# Patient Record
Sex: FEMALE | Race: BLACK OR AFRICAN AMERICAN | NOT HISPANIC OR LATINO | Employment: FULL TIME | URBAN - METROPOLITAN AREA
[De-identification: names, ages, dates, MRNs, and addresses within clinical notes are randomized per-mention and may not be internally consistent; named-entity substitution may affect disease eponyms.]

---

## 2017-08-21 ENCOUNTER — HISTORICAL (OUTPATIENT)
Dept: LAB | Facility: HOSPITAL | Age: 27
End: 2017-08-21

## 2017-08-21 LAB
ABS NEUT (OLG): 6.4 X10(3)/MCL (ref 2.1–9.2)
AMPHET UR QL SCN: NORMAL
BARBITURATE SCN PRESENT UR: NORMAL
BASOPHILS NFR BLD AUTO: 0 % (ref 0–2)
BENZODIAZ UR QL SCN: NORMAL
CANNABINOIDS UR QL SCN: NORMAL
COCAINE UR QL SCN: NORMAL
EOSINOPHIL # BLD AUTO: 0.1 X10(3)/MCL
EOSINOPHIL NFR BLD AUTO: 1 %
ERYTHROCYTE [DISTWIDTH] IN BLOOD BY AUTOMATED COUNT: 13.3 % (ref 11.5–17)
GROUP & RH: NORMAL
HBV SURFACE AG SERPL QL IA: NEGATIVE
HCT VFR BLD AUTO: 38.4 % (ref 37–47)
HCV AB SERPL QL IA: NEGATIVE
HGB BLD-MCNC: 13.1 GM/DL (ref 12–16)
HIV 1+2 AB+HIV1 P24 AG SERPL QL IA: NEGATIVE
LYMPHOCYTES # BLD AUTO: 2 X10(3)/MCL
LYMPHOCYTES NFR BLD AUTO: 22 % (ref 13–40)
MCH RBC QN AUTO: 33.1 PG (ref 27–31)
MCHC RBC AUTO-ENTMCNC: 34 GM/DL (ref 33–36)
MCV RBC AUTO: 97.3 FL (ref 80–94)
MONOCYTES # BLD AUTO: 0.5 X10(3)/MCL
MONOCYTES NFR BLD AUTO: 6 % (ref 2–11)
NEUTROPHILS # BLD AUTO: 6.4 X10(3)/MCL (ref 2.1–9.2)
NEUTROPHILS NFR BLD AUTO: 71 % (ref 47–80)
OPIATES UR QL SCN: NORMAL
PCP UR QL: NORMAL
PH UR STRIP.AUTO: 5 [PH] (ref 5–7.5)
PLATELET # BLD AUTO: 157 X10(3)/MCL (ref 130–400)
PLATELET # BLD EST: ADEQUATE 10*3/UL
PMV BLD AUTO: 11.4 FL (ref 7.4–10.4)
RBC # BLD AUTO: 3.95 X10(6)/MCL (ref 4.2–5.4)
RPR SER QL: NORMAL
T4 FREE SERPL-MCNC: 1.18 NG/DL (ref 0.76–1.46)
TSH SERPL-ACNC: 0.77 MIU/ML (ref 0.36–3.74)
WBC # SPEC AUTO: 9.1 X10(3)/MCL (ref 4.5–11.5)

## 2017-10-16 ENCOUNTER — HISTORICAL (OUTPATIENT)
Dept: LAB | Facility: HOSPITAL | Age: 27
End: 2017-10-16

## 2017-12-22 ENCOUNTER — HISTORICAL (OUTPATIENT)
Dept: LAB | Facility: HOSPITAL | Age: 27
End: 2017-12-22

## 2017-12-22 LAB — GLUCOSE 1H P 100 G GLC PO SERPL-MCNC: 144 MG/DL (ref 100–180)

## 2017-12-29 ENCOUNTER — HISTORICAL (OUTPATIENT)
Dept: LAB | Facility: HOSPITAL | Age: 27
End: 2017-12-29

## 2017-12-29 LAB
GLUCOSE 1H P 100 G GLC PO SERPL-MCNC: 144 MG/DL (ref 100–180)
GLUCOSE 2H P 100 G GLC PO SERPL-MCNC: 132 MG/DL (ref 70–140)
GLUCOSE 3H P 100 G GLC PO SERPL-MCNC: 66 MG/DL (ref 70–115)
GLUCOSE BS SERPL-MCNC: 86 MG/DL (ref 70–115)

## 2018-03-05 LAB — POC BETA-HCG (QUAL): NEGATIVE

## 2018-05-10 LAB — POC BETA-HCG (QUAL): NEGATIVE

## 2018-09-10 LAB — POC BETA-HCG (QUAL): NEGATIVE

## 2018-09-11 ENCOUNTER — HISTORICAL (OUTPATIENT)
Dept: LAB | Facility: HOSPITAL | Age: 28
End: 2018-09-11

## 2018-10-05 ENCOUNTER — HISTORICAL (OUTPATIENT)
Dept: ADMINISTRATIVE | Facility: HOSPITAL | Age: 28
End: 2018-10-05

## 2018-10-05 LAB
BILIRUB SERPL-MCNC: NEGATIVE MG/DL
BLOOD URINE, POC: NEGATIVE
CLARITY, POC UA: CLEAR
COLOR, POC UA: YELLOW
GLUCOSE UR QL STRIP: NEGATIVE
KETONES UR QL STRIP: NEGATIVE
LEUKOCYTE EST, POC UA: NEGATIVE
NITRITE, POC UA: NEGATIVE
PH, POC UA: 5.5
POC BETA-HCG (QUAL): NEGATIVE
PROTEIN, POC: NEGATIVE
SPECIFIC GRAVITY, POC UA: 1.03
UROBILINOGEN, POC UA: NORMAL

## 2019-04-24 LAB — POC BETA-HCG (QUAL): NEGATIVE

## 2019-10-23 ENCOUNTER — HISTORICAL (OUTPATIENT)
Dept: LAB | Facility: HOSPITAL | Age: 29
End: 2019-10-23

## 2019-10-23 LAB
ABS NEUT (OLG): 7.73 X10(3)/MCL (ref 2.1–9.2)
AMPHET UR QL SCN: NORMAL
BARBITURATE SCN PRESENT UR: NORMAL
BASOPHILS # BLD AUTO: 0 X10(3)/MCL (ref 0–0.2)
BASOPHILS NFR BLD AUTO: 0 %
BENZODIAZ UR QL SCN: NORMAL
CANNABINOIDS UR QL SCN: NORMAL
COCAINE UR QL SCN: NORMAL
EOSINOPHIL # BLD AUTO: 0 X10(3)/MCL (ref 0–0.9)
EOSINOPHIL NFR BLD AUTO: 0 %
ERYTHROCYTE [DISTWIDTH] IN BLOOD BY AUTOMATED COUNT: 12.2 % (ref 11.5–17)
GROUP & RH: NORMAL
HBV SURFACE AG SERPL QL IA: NEGATIVE
HCT VFR BLD AUTO: 36.4 % (ref 37–47)
HCV AB SERPL QL IA: NEGATIVE
HGB BLD-MCNC: 11.8 GM/DL (ref 12–16)
HIV 1+2 AB+HIV1 P24 AG SERPL QL IA: NEGATIVE
IMM GRANULOCYTES # BLD AUTO: 0.02 % (ref 0–0.02)
IMM GRANULOCYTES NFR BLD AUTO: 0.2 % (ref 0–0.43)
LYMPHOCYTES # BLD AUTO: 2.3 X10(3)/MCL (ref 0.6–4.6)
LYMPHOCYTES NFR BLD AUTO: 22 %
MCH RBC QN AUTO: 32.1 PG (ref 27–31)
MCHC RBC AUTO-ENTMCNC: 32.4 GM/DL (ref 33–36)
MCV RBC AUTO: 98.9 FL (ref 80–94)
MONOCYTES # BLD AUTO: 0.7 X10(3)/MCL (ref 0.1–1.3)
MONOCYTES NFR BLD AUTO: 6 %
NEUTROPHILS # BLD AUTO: 7.73 X10(3)/MCL (ref 1.4–7.9)
NEUTROPHILS NFR BLD AUTO: 71 %
OPIATES UR QL SCN: NORMAL
PCP UR QL: NORMAL
PH UR STRIP.AUTO: 7 [PH] (ref 5–7.5)
PLATELET # BLD AUTO: 185 X10(3)/MCL (ref 130–400)
PMV BLD AUTO: 13 FL (ref 9.4–12.4)
POC BETA-HCG (QUAL): POSITIVE
RBC # BLD AUTO: 3.68 X10(6)/MCL (ref 4.2–5.4)
T PALLIDUM AB SER QL: NORMAL
T4 FREE SERPL-MCNC: 0.98 NG/DL (ref 0.76–1.46)
TSH SERPL-ACNC: 1.32 MIU/ML (ref 0.36–3.74)
WBC # SPEC AUTO: 10.8 X10(3)/MCL (ref 4.5–11.5)

## 2019-10-24 ENCOUNTER — HISTORICAL (OUTPATIENT)
Dept: LAB | Facility: HOSPITAL | Age: 29
End: 2019-10-24

## 2020-01-02 ENCOUNTER — HISTORICAL (OUTPATIENT)
Dept: LAB | Facility: HOSPITAL | Age: 30
End: 2020-01-02

## 2020-04-09 ENCOUNTER — HISTORICAL (OUTPATIENT)
Dept: CARDIOLOGY | Facility: HOSPITAL | Age: 30
End: 2020-04-09

## 2020-04-11 ENCOUNTER — HISTORICAL (OUTPATIENT)
Dept: OBSTETRICS AND GYNECOLOGY | Facility: HOSPITAL | Age: 30
End: 2020-04-11

## 2020-04-18 ENCOUNTER — HISTORICAL (OUTPATIENT)
Dept: OBSTETRICS AND GYNECOLOGY | Facility: HOSPITAL | Age: 30
End: 2020-04-18

## 2020-04-23 ENCOUNTER — HISTORICAL (OUTPATIENT)
Dept: ADMINISTRATIVE | Facility: HOSPITAL | Age: 30
End: 2020-04-23

## 2020-04-23 LAB
ABS NEUT (OLG): 6.47 X10(3)/MCL (ref 2.1–9.2)
ALT SERPL-CCNC: 9 UNIT/L (ref 0–55)
AST SERPL-CCNC: 13 UNIT/L (ref 5–34)
ERYTHROCYTE [DISTWIDTH] IN BLOOD BY AUTOMATED COUNT: 13.5 % (ref 11.5–17)
HCT VFR BLD AUTO: 33.5 % (ref 37–47)
HGB BLD-MCNC: 11.1 GM/DL (ref 12–16)
LDH SERPL-CCNC: 186 UNIT/L (ref 140–271)
MCH RBC QN AUTO: 33.7 PG (ref 27–31)
MCHC RBC AUTO-ENTMCNC: 33.1 GM/DL (ref 33–36)
MCV RBC AUTO: 101.8 FL (ref 80–94)
PLATELET # BLD AUTO: 114 X10(3)/MCL (ref 130–400)
PMV BLD AUTO: 12.9 FL (ref 9.4–12.4)
RBC # BLD AUTO: 3.29 X10(6)/MCL (ref 4.2–5.4)
URATE SERPL-MCNC: 4.9 MG/DL (ref 2.7–6)
WBC # SPEC AUTO: 8.7 X10(3)/MCL (ref 4.5–11.5)

## 2020-04-25 ENCOUNTER — HISTORICAL (OUTPATIENT)
Dept: OBSTETRICS AND GYNECOLOGY | Facility: HOSPITAL | Age: 30
End: 2020-04-25

## 2020-04-27 ENCOUNTER — HISTORICAL (OUTPATIENT)
Dept: ADMINISTRATIVE | Facility: HOSPITAL | Age: 30
End: 2020-04-27

## 2020-04-27 LAB
ALT SERPL-CCNC: 8 UNIT/L (ref 0–55)
AST SERPL-CCNC: 13 UNIT/L (ref 5–34)
ERYTHROCYTE [DISTWIDTH] IN BLOOD BY AUTOMATED COUNT: 13.5 % (ref 11.5–17)
HCT VFR BLD AUTO: 32.2 % (ref 37–47)
HGB BLD-MCNC: 11 GM/DL (ref 12–16)
LDH SERPL-CCNC: 192 UNIT/L (ref 140–271)
MCH RBC QN AUTO: 34.2 PG (ref 27–31)
MCHC RBC AUTO-ENTMCNC: 34.2 GM/DL (ref 33–36)
MCV RBC AUTO: 100 FL (ref 80–94)
PLATELET # BLD AUTO: 109 X10(3)/MCL (ref 130–400)
PMV BLD AUTO: 12.9 FL (ref 9.4–12.4)
RBC # BLD AUTO: 3.22 X10(6)/MCL (ref 4.2–5.4)
URATE SERPL-MCNC: 4.9 MG/DL (ref 2.7–6)
WBC # SPEC AUTO: 7.2 X10(3)/MCL (ref 4.5–11.5)

## 2020-04-30 ENCOUNTER — HISTORICAL (OUTPATIENT)
Dept: ADMINISTRATIVE | Facility: HOSPITAL | Age: 30
End: 2020-04-30

## 2020-04-30 LAB
ALT SERPL-CCNC: 8 UNIT/L (ref 0–55)
AST SERPL-CCNC: 14 UNIT/L (ref 5–34)
ERYTHROCYTE [DISTWIDTH] IN BLOOD BY AUTOMATED COUNT: 13.6 % (ref 11.5–17)
HCT VFR BLD AUTO: 34.6 % (ref 37–47)
HGB BLD-MCNC: 11.6 GM/DL (ref 12–16)
LDH SERPL-CCNC: 215 UNIT/L (ref 140–271)
MCH RBC QN AUTO: 33.8 PG (ref 27–31)
MCHC RBC AUTO-ENTMCNC: 33.5 GM/DL (ref 33–36)
MCV RBC AUTO: 100.9 FL (ref 80–94)
PLATELET # BLD AUTO: 113 X10(3)/MCL (ref 130–400)
PMV BLD AUTO: 12.2 FL (ref 9.4–12.4)
RBC # BLD AUTO: 3.43 X10(6)/MCL (ref 4.2–5.4)
URATE SERPL-MCNC: 4.8 MG/DL (ref 2.7–6)
WBC # SPEC AUTO: 7.8 X10(3)/MCL (ref 4.5–11.5)

## 2020-06-10 ENCOUNTER — HISTORICAL (OUTPATIENT)
Dept: LAB | Facility: HOSPITAL | Age: 30
End: 2020-06-10

## 2020-06-10 LAB
B-HCG FREE SERPL-ACNC: <1 MIU/ML
BILIRUB SERPL-MCNC: NEGATIVE MG/DL
BLOOD URINE, POC: NEGATIVE
GLUCOSE UR QL STRIP: NEGATIVE
KETONES UR QL STRIP: NORMAL
LEUKOCYTE EST, POC UA: NEGATIVE
NITRITE, POC UA: NEGATIVE
PH, POC UA: 5
POC BETA-HCG (QUAL): POSITIVE
PROTEIN, POC: NEGATIVE
SPECIFIC GRAVITY, POC UA: 1.02
UROBILINOGEN, POC UA: NORMAL

## 2020-10-15 LAB — POC BETA-HCG (QUAL): NEGATIVE

## 2021-05-11 LAB — POC BETA-HCG (QUAL): NEGATIVE

## 2021-05-12 ENCOUNTER — HISTORICAL (OUTPATIENT)
Dept: RADIOLOGY | Facility: HOSPITAL | Age: 31
End: 2021-05-12

## 2021-11-03 LAB — POC BETA-HCG (QUAL): NEGATIVE

## 2022-02-01 ENCOUNTER — HISTORICAL (OUTPATIENT)
Dept: ADMINISTRATIVE | Facility: HOSPITAL | Age: 32
End: 2022-02-01

## 2022-02-01 ENCOUNTER — HISTORICAL (OUTPATIENT)
Dept: RADIOLOGY | Facility: HOSPITAL | Age: 32
End: 2022-02-01

## 2022-04-11 ENCOUNTER — HISTORICAL (OUTPATIENT)
Dept: ADMINISTRATIVE | Facility: HOSPITAL | Age: 32
End: 2022-04-11
Payer: MEDICAID

## 2022-04-27 VITALS
HEIGHT: 67 IN | SYSTOLIC BLOOD PRESSURE: 121 MMHG | OXYGEN SATURATION: 100 % | BODY MASS INDEX: 21.03 KG/M2 | DIASTOLIC BLOOD PRESSURE: 67 MMHG | WEIGHT: 134 LBS

## 2022-05-04 NOTE — HISTORICAL [facility] CERNER
This is a historical note converted from [employer]. Formatting and pictures may have been removed.  Please reference [employer] for original formatting and attached multimedia. Chief Complaint  stomach cramps x 1 week  History of Present Illness  Patient is a 28-year-old -American female who presents today?due to abdominal cramping especially after eating?high fatty meal. ?Patient has been on some constipation and diarrhea this week.? Patient is breast-feeding an 8-month-old and has not had menstrual cycle.  Review of Systems  Constitutional: negative except as stated in HPI  Eye: negative except as stated in HPI  ENMT: negative except as stated in HPI  Respiratory: negative except as stated in HPI  Cardiovascular: negative except as stated in HPI  Gastrointestinal: negative except as stated in HPI  Genitourinary: negative except as stated in HPI  Hema/Lymph: negative except as stated in HPI  Endocrine: negative except as stated in HPI  Immunologic: negative except as stated in HPI  Musculoskeletal: negative except as stated in HPI  Integumentary: negative except as stated in HPI  Neurologic: negative except as stated in HPI  All Other ROS_? negative except as stated in HPI  ?  ?  Physical Exam  Vitals & Measurements  T:?36.9? ?C (Oral)? HR:?82(Peripheral)? RR:?20? BP:?157/74? SpO2:?100%?  HT:?170?cm? HT:?170?cm? WT:?52.6?kg? WT:?52.6?kg? BMI:?18.2?  General: Alert and oriented, No acute distress.  Eye: Pupils are equal, round and reactive to light, Extraocular movements are intact.  HENT: Normocephalic.?  Respiratory: Lungs are clear to auscultation, Respirations are non-labored, Breath sounds are equal, Symmetrical chest wall expansion.  Cardiovascular: Normal rate, Regular rhythm, No murmur.  Gastrointestinal: Soft, mild tenderness?right upper quadrant, Non-distended, Normal bowel sounds.  Genitourinary: Voiding well.? No CVA tenderness  Musculoskeletal: Normal range of motion.  Integumentary: Warm, Dry,  Intact.  Neurologic: No focal deficits, Cranial Nerves II-XII are grossly intact.  X-ray preliminary:  Assessment/Plan  1.?Abdominal cramping  ? Avoid fatty foods  Follow-up with PCP?for?gallbladder workup  Ordered:  XR Abdomen KUB 1 View, Stat, 10/05/18 19:24:00 CDT, Abdominal Pain, None, Ambulatory, Rad Type, Abdominal cramping  Amenorrhea, Not Scheduled, 10/05/18 19:24:00 CDT  ?  2.?Amenorrhea  ? Discussed contraception?ways to avoid pregnancy?during breast-feeding?without birth control  Ordered:  XR Abdomen KUB 1 View, Stat, 10/05/18 19:24:00 CDT, Abdominal Pain, None, Ambulatory, Rad Type, Abdominal cramping  Amenorrhea, Not Scheduled, 10/05/18 19:24:00 CDT  ?  3.?Cholelithiasis  ?Follow-up with PCP for gallbladder workup  No fatty foods  ?  Instructed patient to follow-up with PCP if not improved in 2-3 days. ?ED precautions if symptoms worsen.   Problem List/Past Medical History  Ongoing  No qualifying data  Historical  No Chronic Problems  Pregnant  Pregnant   premature rupture of membranes  Procedure/Surgical History  Delivery of Products of Conception, External Approach (2018)  DIRECT ADMISSION OF PATIENT FOR HOSPITAL OBSERVATION CARE (2014)  none   Medications  docusate calcium 240 mg oral capsule, 240 mg= 1 cap(s), Oral, Daily, PRN,? ?Not taking  medroxyPROGESTERone 150 mg/mL intramuscular suspension, 150 mg= 1 mL, IM, q3mo, 3 refills,? ?Not taking  Multi-Day with Calcium and Extra Iron oral tablet, 1 tab(s), Oral, Daily, 11 refills  Ortho Micronor 0.35 mg oral tablet, 0.35 mg= 1 tab(s), Oral, Daily, 11 refills,? ?Not taking  Ortho Micronor 0.35 mg oral tablet, 0.35 mg= 1 tab(s), Oral, Daily, 11 refills,? ?Not taking   oral tablet ([facility] Substitution), 1 tab(s), Oral, Daily, 9 refills,? ?Not taking  Allergies  No Known Allergies  Social History  Alcohol - Denies Alcohol Use, 02/15/2013  Past, 2018  Substance Abuse - Denies Substance Abuse, 02/15/2013  Never,  02/01/2018  Tobacco - Denies Tobacco Use, 02/15/2013  Never smoker, 02/21/2017  Family History  Family history is negative  Immunizations  Vaccine Date Status   tetanus/diphtheria/pertussis, acel(Tdap) 02/02/2018 Given   Health Maintenance  Health Maintenance  ???Pending?(in the next year)  ??? ??Due?  ??? ? ? ?ADL Screening due??10/05/18??and every 1??year(s)  ??? ? ? ?Alcohol Misuse Screening due??10/05/18??and every 1??year(s)  ??? ? ? ?Influenza Vaccine due??10/05/18??and every?  ???Satisfied?(in the past 1 year)  ??? ??Satisfied?  ??? ? ? ?Blood Pressure Screening on??10/05/18.??Satisfied by Christy Kaur LPN  ??? ? ? ?Body Mass Index Check on??10/05/18.??Satisfied by Christy Kaur LPN  ??? ? ? ?Depression Screening on??10/05/18.??Satisfied by Christy Kaur LPN  ??? ? ? ?Influenza Vaccine on??10/05/18.??Satisfied by Christy Kaur LPN  ??? ? ? ?Obesity Screening on??10/05/18.??Satisfied by Christy Kaur LPN  ??? ? ? ?Tetanus Vaccine on??02/02/18.??Satisfied by Fátima Roca RN  ?  ?      KUB?no acute findings

## 2022-08-29 ENCOUNTER — APPOINTMENT (OUTPATIENT)
Dept: RADIOLOGY | Facility: HOSPITAL | Age: 32
End: 2022-08-29
Payer: MEDICAID

## 2022-08-29 DIAGNOSIS — Z91.89 AT HIGH RISK FOR BREAST CANCER: ICD-10-CM

## 2022-08-29 LAB
CREAT SERPL-MCNC: 0.6 MG/DL (ref 0.5–1.4)
SAMPLE: NORMAL

## 2022-08-29 PROCEDURE — 77049 MRI BREAST C-+ W/CAD BI: CPT | Mod: TC

## 2022-08-29 PROCEDURE — A9577 INJ MULTIHANCE: HCPCS

## 2022-08-29 PROCEDURE — 77049 MRI BREAST W/WO CONTRAST, W/CAD, BILATERAL: ICD-10-PCS | Mod: 26,,, | Performed by: STUDENT IN AN ORGANIZED HEALTH CARE EDUCATION/TRAINING PROGRAM

## 2022-08-29 PROCEDURE — 25500020 PHARM REV CODE 255

## 2022-08-29 PROCEDURE — 77049 MRI BREAST C-+ W/CAD BI: CPT | Mod: 26,,, | Performed by: STUDENT IN AN ORGANIZED HEALTH CARE EDUCATION/TRAINING PROGRAM

## 2022-08-29 RX ADMIN — GADOBENATE DIMEGLUMINE 13 ML: 529 INJECTION, SOLUTION INTRAVENOUS at 01:08

## 2022-09-21 ENCOUNTER — HISTORICAL (OUTPATIENT)
Dept: ADMINISTRATIVE | Facility: HOSPITAL | Age: 32
End: 2022-09-21
Payer: MEDICAID

## 2023-02-03 ENCOUNTER — HOSPITAL ENCOUNTER (OUTPATIENT)
Dept: RADIOLOGY | Facility: HOSPITAL | Age: 33
Discharge: HOME | End: 2023-02-03
Payer: MEDICAID

## 2023-02-03 DIAGNOSIS — Z12.31 BREAST CANCER SCREENING BY MAMMOGRAM: ICD-10-CM

## 2023-02-03 PROCEDURE — 77067 SCR MAMMO BI INCL CAD: CPT | Mod: 26,,, | Performed by: RADIOLOGY

## 2023-02-03 PROCEDURE — 77067 MAMMO DIGITAL SCREENING BILAT WITH TOMO: ICD-10-PCS | Mod: 26,,, | Performed by: RADIOLOGY

## 2023-02-03 PROCEDURE — 77063 MAMMO DIGITAL SCREENING BILAT WITH TOMO: ICD-10-PCS | Mod: 26,,, | Performed by: RADIOLOGY

## 2023-02-03 PROCEDURE — 77067 SCR MAMMO BI INCL CAD: CPT | Mod: TC

## 2023-02-03 PROCEDURE — 77063 BREAST TOMOSYNTHESIS BI: CPT | Mod: 26,,, | Performed by: RADIOLOGY

## 2023-03-16 ENCOUNTER — OFFICE VISIT (OUTPATIENT)
Dept: SURGERY | Facility: CLINIC | Age: 33
End: 2023-03-16
Payer: MEDICAID

## 2023-03-16 VITALS
WEIGHT: 135.19 LBS | HEIGHT: 67 IN | BODY MASS INDEX: 21.22 KG/M2 | SYSTOLIC BLOOD PRESSURE: 137 MMHG | RESPIRATION RATE: 18 BRPM | DIASTOLIC BLOOD PRESSURE: 83 MMHG | TEMPERATURE: 98 F | OXYGEN SATURATION: 100 % | HEART RATE: 70 BPM

## 2023-03-16 DIAGNOSIS — Z80.0 FAMILY HISTORY OF PANCREATIC CANCER: ICD-10-CM

## 2023-03-16 DIAGNOSIS — Z91.89 AT HIGH RISK FOR BREAST CANCER: Primary | ICD-10-CM

## 2023-03-16 DIAGNOSIS — Z80.3 FAMILY HISTORY OF BREAST CANCER: ICD-10-CM

## 2023-03-16 PROCEDURE — 3008F PR BODY MASS INDEX (BMI) DOCUMENTED: ICD-10-PCS | Mod: CPTII,,, | Performed by: PHYSICIAN ASSISTANT

## 2023-03-16 PROCEDURE — 99214 PR OFFICE/OUTPT VISIT, EST, LEVL IV, 30-39 MIN: ICD-10-PCS | Mod: S$PBB,,, | Performed by: PHYSICIAN ASSISTANT

## 2023-03-16 PROCEDURE — 1159F PR MEDICATION LIST DOCUMENTED IN MEDICAL RECORD: ICD-10-PCS | Mod: CPTII,,, | Performed by: PHYSICIAN ASSISTANT

## 2023-03-16 PROCEDURE — 99999 PR PBB SHADOW E&M-EST. PATIENT-LVL IV: CPT | Mod: PBBFAC,,, | Performed by: PHYSICIAN ASSISTANT

## 2023-03-16 PROCEDURE — 99214 OFFICE O/P EST MOD 30 MIN: CPT | Mod: PBBFAC | Performed by: PHYSICIAN ASSISTANT

## 2023-03-16 PROCEDURE — 99999 PR PBB SHADOW E&M-EST. PATIENT-LVL IV: ICD-10-PCS | Mod: PBBFAC,,, | Performed by: PHYSICIAN ASSISTANT

## 2023-03-16 PROCEDURE — 3075F SYST BP GE 130 - 139MM HG: CPT | Mod: CPTII,,, | Performed by: PHYSICIAN ASSISTANT

## 2023-03-16 PROCEDURE — 3079F PR MOST RECENT DIASTOLIC BLOOD PRESSURE 80-89 MM HG: ICD-10-PCS | Mod: CPTII,,, | Performed by: PHYSICIAN ASSISTANT

## 2023-03-16 PROCEDURE — 3008F BODY MASS INDEX DOCD: CPT | Mod: CPTII,,, | Performed by: PHYSICIAN ASSISTANT

## 2023-03-16 PROCEDURE — 99214 OFFICE O/P EST MOD 30 MIN: CPT | Mod: S$PBB,,, | Performed by: PHYSICIAN ASSISTANT

## 2023-03-16 PROCEDURE — 1160F PR REVIEW ALL MEDS BY PRESCRIBER/CLIN PHARMACIST DOCUMENTED: ICD-10-PCS | Mod: CPTII,,, | Performed by: PHYSICIAN ASSISTANT

## 2023-03-16 PROCEDURE — 1159F MED LIST DOCD IN RCRD: CPT | Mod: CPTII,,, | Performed by: PHYSICIAN ASSISTANT

## 2023-03-16 PROCEDURE — 1160F RVW MEDS BY RX/DR IN RCRD: CPT | Mod: CPTII,,, | Performed by: PHYSICIAN ASSISTANT

## 2023-03-16 PROCEDURE — 3075F PR MOST RECENT SYSTOLIC BLOOD PRESS GE 130-139MM HG: ICD-10-PCS | Mod: CPTII,,, | Performed by: PHYSICIAN ASSISTANT

## 2023-03-16 PROCEDURE — 3079F DIAST BP 80-89 MM HG: CPT | Mod: CPTII,,, | Performed by: PHYSICIAN ASSISTANT

## 2023-03-16 RX ORDER — HYDROCHLOROTHIAZIDE 25 MG/1
TABLET ORAL
COMMUNITY
End: 2024-01-18 | Stop reason: SDUPTHER

## 2023-03-16 NOTE — PROGRESS NOTES
[facility] Breast Surg  Breast Surgical Oncology  Follow-Up Patient Office Visit       Referring Provider: No ref. provider found   PCP: La Jeong NP   Care Team:   OBGYN: Dr. Stone Upton    Chief Complaint:   Chief Complaint   Patient presents with    Follow-up     Patient is doing well, No complaints, No Pain        Subjective:     Interval History:  3/16/2023 - Rebecca Humphrey is here today for annual follow-up for high-risk of breast cancer.  She is doing well and reports no issues in the interval.  She had her screening breast MRI in 2022 in screening mammogram in 2023.  Both are negative for any signs of malignancy.      HPI:  Rebecca Humphrey is a pleasant Female patient who initially presents on  22 at 31 Years old for evaluation and assessment of risk for breast cancer based on the Tyrer-Cuzick Breast Cancer Risk Model (>20% indicates elevated lifetime risk). Her lifetime risk is calculated to be: 26.1% (re-calculated 3/16/2023)     She currently denies any breast issues including rashes, redness,swelling, nipple discharge, or new lumps/masses.  On initial presentation, she was experiencing intermittent left breast pain x's 6 months. Diagnostic work up for this pain in 2022 was benign.    Imagin2022 BL DG MG /US at [facility] [place] to evaluate left breast pain - BENIGN. No mammographic evidence of malignancy in either breast. No suspicious mammographic or sonographic findings in the area of the patient's pain in the left breast 2:00, 4 cm from the nipple.  2022 Breast MRI at [facility] [place] - No MRI evidence of malignancy in either breast.  2/3/2023 SCR MG at [facility] [place] - There is no mammographic evidence of malignancy.     OB/GYN History:  Menarche Onset: 10  Menopause: Premenopausal, Patient's last menstrual period was 2023 (approximate).  Hormonal birth control (duration): 3 years  Pregnancies: 3  Age at first child birth: 24  Child births:  4  Breastfeeding duration:  18months total  Hysterectomy: no  Oophorectomy: no  HRT: no    Other:  # of breast biopsies (when and pathology results): none  MG breast density: BIRADS D, extremely dense  Prior thoracic RT: none  Genetic testing: none  Ashkenazi Alevism descent: No    Family History:  Family History   Problem Relation Age of Onset    Hypertension Mother     Heart failure Mother     Breast cancer Maternal Grandmother 61    Breast cancer Paternal Aunt 52    Melanoma Maternal Aunt 61    Pancreatic cancer Paternal Uncle 60      Patient History:  History reviewed. No pertinent past medical history.    Past Surgical History:   Procedure Laterality Date     SECTION WITH TUBAL LIGATION         Social History     Socioeconomic History    Marital status: Single   Tobacco Use    Smoking status: Never    Smokeless tobacco: Never       Immunization History   Administered Date(s) Administered    COVID-19, MRNA, LN-S, PF (Pfizer) (Purple Cap) 2021, 2021, 10/24/2021       Medications/Allergies:  Current Outpatient Medications on File Prior to Visit   Medication Sig Dispense Refill    cetirizine (ZYRTEC) 10 MG tablet Take 10 mg by mouth once daily.      hydroCHLOROthiazide (HYDRODIURIL) 25 MG tablet hydrochlorothiazide 25 mg tablet   TAKE 1 TABLET BY MOUTH EVERY DAY      lisinopriL (PRINIVIL,ZESTRIL) 20 MG tablet Take 20 mg by mouth once daily.       No current facility-administered medications on file prior to visit.       Review of patient's allergies indicates:  No Known Allergies    Review of Systems:  Pertinent items are noted in HPI.     Objective:     Vitals:  Vitals:    23 1054   BP: 137/83   Pulse: 70   Resp: 18   Temp: 98.1 °F (36.7 °C)       Body mass index is 21.18 kg/m².     Physical Exam:  General: The patient is awake, alert and oriented times three. The patient is well nourished and in no acute distress.  Neck: There is no evidence of palpable cervical, supraclavicular or  axillary adenopathy. The neck is supple. The thyroid is not enlarged.  Musculoskeletal: The patient has a normal range of motion of her bilateral upper extremities.  Chest: Examination of the chest wall fails to reveal any obvious abnormalities. Nonlabored breathing, symmetric expansion.  Breast:  Right: Examination of right breast fails to reveal any dominant masses or areas of significant focal nodularity. The nipple is everted without evidence of discharge. There is no skin dimpling with movement of the pectoralis. There are no significant skin changes overlying the breast.   Left: Examination of the left breast fails to reveal any dominant masses or areas of significant focal nodularity. The nipple is everted without evidence of discharge. There is no skin dimpling with movement of the pectoralis. There are no significant skin changes overlying the breast.  Abdomen: The abdomen is soft, flat, nontender and nondistended.  Integumentary: no rashes or skin lesions present  Neurologic: cranial nerves intact, no signs of peripheral neurological deficit, motor/sensory function intact      Assessment and Plan:       Rebecca was seen today for follow-up.    Diagnoses and all orders for this visit:    At high risk for breast cancer  -     MRI Breast w/wo Contrast, w/CAD, Bilateral; Future    Family history of breast cancer  -     Mammo Digital Screening Bilat w/ Zach; Future    Family history of pancreatic cancer          Plan:     PLAN:    1. Lifestyle - Healthy lifestyle guidelines were reviewed. She was encouraged to engage in regular exercise, maintain a healthy body weight, and avoid excessive alcohol consumption. Healthy nutritional guidelines were also discussed. Self-breast examination was reviewed with the patient in detail and she was encouraged to perform this on a monthly basis.    2. Surveillance - She desires continuing high risk screening with annual screening mammograms and breast MRIs. In the absence of  significant clinical findings in the interval, I recommend Breast MRI in August/September 2023. SCR MG due 2/2024. RTC in 1 year for CBE. Continue follow up with GYN for breast exams as well.    3. Prevention - We had a brief discussion/education about indications for preventative mastectomy or chemoprevention.  These methods are not recommended to her at this time.    4. Genetics - According to NCCN guidelines, she meets criteria for genetic testing. Referral to genetic counseling placed. ([facility])      All of her questions were answered. She was advised to call if she develops any questions or concerns.    Elizabeth Harris PA-C          Total time on the date of the visit ranged from 30-39 mins (09242). Total time includes both face-to-face and non-face-to-face time personally spent by myself on the day of the visit.    Non-face-to-face time included:  _X_ preparing to see the patient such as reviewing the patient record  __ obtaining and reviewing separately obtained history  _X_ independently interpreting results  _X_ documenting clinical information in electronic health record.    Face-to-face time included:  _X_ performing an appropriate history and examination  _X_ communicating results to the patient  _X_ counseling and educating the patient  __ ordering appropriate medications  _x_ ordering appropriate tests  _X_ ordering appropriate procedures (including follow-up)  _X_ answering any questions the patient had    Total Time spent on date of visit: 35 minutes

## 2023-10-16 ENCOUNTER — OFFICE VISIT (OUTPATIENT)
Dept: HEMATOLOGY/ONCOLOGY | Facility: CLINIC | Age: 33
End: 2023-10-16
Payer: MEDICAID

## 2023-10-16 DIAGNOSIS — Z80.0 FAMILY HISTORY OF PANCREATIC CANCER: ICD-10-CM

## 2023-10-16 DIAGNOSIS — Z91.89 AT HIGH RISK FOR BREAST CANCER: ICD-10-CM

## 2023-10-16 DIAGNOSIS — Z80.3 FAMILY HISTORY OF BREAST CANCER: ICD-10-CM

## 2023-10-16 PROCEDURE — 99205 PR OFFICE/OUTPT VISIT, NEW, LEVL V, 60-74 MIN: ICD-10-PCS | Mod: 95,,, | Performed by: NURSE PRACTITIONER

## 2023-10-16 PROCEDURE — 99205 OFFICE O/P NEW HI 60 MIN: CPT | Mod: 95,,, | Performed by: NURSE PRACTITIONER

## 2023-10-16 NOTE — PROGRESS NOTES
REFERRING PROVIDER: GAIL Gan      Patient ID: Rebecca Humphrey is a 33 y.o. female.    Chief Complaint: No personal history of cancer but a family history of cancer. Patient presented via Telemedicine Visit (audio and video) today for risk assessment, genetic counseling, and consideration for genetic testing.    HPI  No past medical history on file.     Past Surgical History:   Procedure Laterality Date     SECTION WITH TUBAL LIGATION          Review of patient's allergies indicates:  Patient has no known allergies.     Review of Systems        Problem List Items Addressed This Visit    None       Oncology History    No history exists.      Family History   Problem Relation Age of Onset    Hypertension Mother     Heart failure Mother     Breast cancer Maternal Grandmother 61    Melanoma Maternal Aunt 61    Breast cancer Paternal Aunt 46    Pancreatic cancer Paternal Uncle 60            Assessment:   Risk Assessment:  This patient is at increased risk of having an inherited genetic mutation that increases the risk for cancer. Patient meets criteria for genetic testing based on the National Comprehensive Cancer Network (NCCN) criteria due to a family history that includes breast cancer diagnosed under 50y (paternal aunt dx46y) and pancreatic cancer on the same side of the family (paternal uncle) (see family history and pedigree). Based on the likelihood of having a mutation, BRCA1/2 Analysis with [employer] CancerNext-Expanded+RNAinsight panel testing was described in detail.    Education and Counseling:  [employer] CancerNext-Expanded evaluates a broad number of hereditary cancer syndromes to help define patients' cancer risk. This cancer panel tests (77 total): AIP, ALK, APC*, HANS*, AXIN2, BAP1, BARD1, BLM, BMPR1A, BRCA1*, BRCA2*, BRIP1*, CDC73, CDH1*, CDK4, CDKN1B, CDKN2A, CHEK2*, CTNNA1, DICER1, FANCC, FH, FLCN, GALNT12, KIF1B, LZTR1, MAX, MEN1, MET, MLH1*, MSH2*, MSH3, MSH6*,  MUTYH*, NBN, NF1*, NF2, NTHL1, PALB2*, PHOX2B, PMS2*, POT1, IHXZV4E, PTCH1, PTEN*, RAD51C*, RAD51D*, RB1, RECQL, RET, SDHA, SDHAF2, SDHB, SDHC, SDHD, SMAD4, SMARCA4, SMARCB1, SMARCE1, STK11, SUFU, MXNJ715, TP53*, TSC1, TSC2, VHL and XRCC2 (sequencing and deletion/duplication); EGFR, EGLN1, HOXB13, KIT, MITF, PDGFRA, POLD1 and POLE (sequencing only); EPCAM and GREM1 (deletion/duplication only). DNA and RNA analyses performed for * genes.    Risks of cancer associated with inherited cancer predisposition mutations were discussed in detail.  If a mutation were found, this patient would have a significantly increased risk for cancer.  Inherited cancer syndromes included in this test, may have different, but still significant risk for cancer.  Risk of cancer with any particular gene mutation will be discussed at the time of results disclosure and based on the results.    The availability of clinical management options for inherited cancer predisposition mutation carriers was discussed, including increased surveillance, chemoprevention, and prophylactic surgery. Details of the testing process, including benefits and limitations of genetic analysis as well as the implications of possible test results, were discussed.  Because this patient is the first member of the family to be tested comprehensive panel testing was presented.  Related insurance issues were discussed.      Summary:  This patient was evaluated for hereditary risk of cancer and was found to be at an increased risk of having an inherited cancer predisposition gene mutation.  The option of genetic testing was explained in detail, including the possible impact of this information on family members.  Since this patient wishes to proceed with testing an informed consent was obtained and blood drawn and sent to [facility].  Results will be expected 4 weeks from this time.  A follow-up appointment will be scheduled for results disclosure.       The patient  location is: home.     Visit type: audiovisual    Face to Face time with patient: 40 minutes  >60 minutes of total time spent on the encounter, which includes face to face time and non-face to face time preparing to see the patient (eg, review of tests), Obtaining and/or reviewing separately obtained history, Documenting clinical information in the electronic or other health record, Independently interpreting results (not separately reported) and communicating results to the patient/family/caregiver, or Care coordination (not separately reported).       Each patient to whom he or she provides medical services by telemedicine is:  (1) informed of the relationship between the physician and patient and the respective role of any other health care provider with respect to management of the patient; and (2) notified that he or she may decline to receive medical services by telemedicine and may withdraw from such care at any time.    MIAH KHOURY, PhD     Answers submitted by the patient for this visit:  Review of Systems Questionnaire (Submitted on 10/15/2023)  appetite change : No  unexpected weight change: No  mouth sores: No  visual disturbance: No  cough: No  shortness of breath: No  chest pain: No  abdominal pain: No  diarrhea: No  frequency: No  back pain: No  rash: No  headaches: No  adenopathy: No  nervous/ anxious: No

## 2023-12-04 ENCOUNTER — OFFICE VISIT (OUTPATIENT)
Dept: HEMATOLOGY/ONCOLOGY | Facility: CLINIC | Age: 33
End: 2023-12-04
Payer: MEDICAID

## 2023-12-04 DIAGNOSIS — Z80.3 FAMILY HISTORY OF BREAST CANCER: Primary | ICD-10-CM

## 2023-12-04 DIAGNOSIS — Z80.0 FAMILY HISTORY OF PANCREATIC CANCER: ICD-10-CM

## 2023-12-04 PROCEDURE — 99213 PR OFFICE/OUTPT VISIT, EST, LEVL III, 20-29 MIN: ICD-10-PCS | Mod: 95,,, | Performed by: NURSE PRACTITIONER

## 2023-12-04 PROCEDURE — 99213 OFFICE O/P EST LOW 20 MIN: CPT | Mod: 95,,, | Performed by: NURSE PRACTITIONER

## 2023-12-04 NOTE — PROGRESS NOTES
REFERRING PROVIDER: GAIL Gan     Patient ID: Rebecca Humphrey is a 33 y.o. female.    Chief Complaint: No personal history of cancer but a family history of cancer. Patient presented for genetic counseling on 10/16/2023 and was found appropriate for genetic testing  based on the National Comprehensive Cancer Network (NCCN) criteria due to a family history that includes breast cancer diagnosed under 50y (paternal aunt dx46y) and pancreatic cancer on the same side of the family (paternal uncle) (see family history and pedigree). Patient signed consent, lab was drawn and sent to [facility] for BRCA1/2 Analyses with CancerNext-Expanded+RNAinsight panel testing. Patient presented via Telemedicine Visit (audio and video) today for results disclosure.     HPI  No past medical history on file.     Review of patient's allergies indicates:  No Known Allergies     Review of Systems   Constitutional:  Negative for appetite change and unexpected weight change.   HENT:  Negative for mouth sores.    Eyes:  Negative for visual disturbance.   Respiratory:  Negative for cough and shortness of breath.    Cardiovascular:  Negative for chest pain.   Gastrointestinal:  Negative for abdominal pain and diarrhea.   Genitourinary:  Negative for frequency.   Musculoskeletal:  Negative for back pain.   Integumentary:  Negative for rash.   Neurological:  Negative for headaches.   Hematological:  Negative for adenopathy.   Psychiatric/Behavioral:  The patient is not nervous/anxious.            Problem List Items Addressed This Visit    None    Oncology History    No history exists.            Family History:  Family History   Problem Relation Age of Onset    Hypertension Mother     Heart failure Mother     Breast cancer Maternal Grandmother 61    Melanoma Maternal Aunt 61    Breast cancer Paternal Aunt 46    Pancreatic cancer Paternal Uncle 60        Assessment:     Genetic testing was appropriate for this patient  because of her personal and family history. This comprehensive [facility] CancerNext analysis indicated that there was no deleterious mutation in  (77 total): AIP, ALK, APC, HANS, AXIN2, BAP1, BARD1, BLM, BMPR1A, BRCA1, BRCA2, BRIP1, CDC73, CDH1, CDK4, CDKN1B, CDKN2A, CHEK2, CTNNA1, DICER1, FANCC, FH, FLCN, GALNT12, KIF1B, LZTR1, MAX, MEN1, MET, MLH1, MSH2, MSH3, MSH6, MUTYH, NBN, NF1, NF2, NTHL1, PALB2, PHOX2B, PMS2, POT1, NWTKP4C, PTCH1, PTEN, RAD51C, RAD51D, RB1, RECQL, RET, SDHA, SDHAF2, SDHB, SDHC, SDHD, SMAD4, SMARCA4, SMARCB1, SMARCE1, STK11, SUFU, LYSM881, TP53, TSC1, TSC2, VHL and XRCC2 (sequencing and deletion/duplication); EGFR, EGLN1, HOXB13, KIT, MITF, PDGFRA, POLD1 and POLE (sequencing only); EPCAM and GREM1 (deletion/duplication only). RNA data is routinely analyzed for use in variant interpretation for all genes. However, there were two (2) variants of uncertain significance (VUS): NF1 p.A887V and TSC2 p.P685L. There are currently insufficient data to determine if either variant does or does not cause increased cancer risk. Therefore, the contribution of either variant to the relative risk of cancer cannot be established from this analysis. If as a result of ongoing reclassification efforts, [facility] should determine that either variant becomes clinically actionable, an amended report will be sent to me as healthcare provider. I will then contact the patient for a discussion of implications of this new information and a healthcare plan will be made accordingly.      A copy of the result will be emailed to: [email or web address]     The patient location is: home.     Visit type: audiovisual    Face to Face time with patient: 10 minutes  20 minutes of total time spent on the encounter, which includes face to face time and non-face to face time preparing to see the patient (eg, review of tests), Obtaining and/or reviewing separately obtained history, Documenting clinical information in the  electronic or other health record, Independently interpreting results (not separately reported) and communicating results to the patient/family/caregiver, or Care coordination (not separately reported).       Each patient to whom he or she provides medical services by telemedicine is:  (1) informed of the relationship between the physician and patient and the respective role of any other health care provider with respect to management of the patient; and (2) notified that he or she may decline to receive medical services by telemedicine and may withdraw from such care at any time.    MIAH KHOURY, PhD

## 2023-12-07 ENCOUNTER — HOSPITAL ENCOUNTER (OUTPATIENT)
Dept: RADIOLOGY | Facility: HOSPITAL | Age: 33
Discharge: HOME | End: 2023-12-07
Attending: PHYSICIAN ASSISTANT
Payer: MEDICAID

## 2023-12-07 DIAGNOSIS — R92.8 ABNORMAL MRI, BREAST: ICD-10-CM

## 2023-12-07 DIAGNOSIS — Z91.89 INCREASED RISK OF BREAST CANCER: ICD-10-CM

## 2023-12-07 PROCEDURE — 76642 US BREAST RIGHT LIMITED: ICD-10-PCS | Mod: 26,RT,, | Performed by: RADIOLOGY

## 2023-12-07 PROCEDURE — 77066 DX MAMMO INCL CAD BI: CPT | Mod: 26,,, | Performed by: RADIOLOGY

## 2023-12-07 PROCEDURE — 77066 DX MAMMO INCL CAD BI: CPT | Mod: TC

## 2023-12-07 PROCEDURE — 77062 BREAST TOMOSYNTHESIS BI: CPT | Mod: 26,,, | Performed by: RADIOLOGY

## 2023-12-07 PROCEDURE — 77066 MAMMO DIGITAL DIAGNOSTIC BILAT WITH TOMO: ICD-10-PCS | Mod: 26,,, | Performed by: RADIOLOGY

## 2023-12-07 PROCEDURE — 77062 MAMMO DIGITAL DIAGNOSTIC BILAT WITH TOMO: ICD-10-PCS | Mod: 26,,, | Performed by: RADIOLOGY

## 2023-12-07 PROCEDURE — 76642 ULTRASOUND BREAST LIMITED: CPT | Mod: TC,RT

## 2023-12-07 PROCEDURE — 76642 ULTRASOUND BREAST LIMITED: CPT | Mod: 26,RT,, | Performed by: RADIOLOGY

## 2023-12-12 DIAGNOSIS — N63.11 MASS OF UPPER OUTER QUADRANT OF RIGHT BREAST: ICD-10-CM

## 2023-12-12 DIAGNOSIS — R92.8 ABNORMAL MRI, BREAST: Primary | ICD-10-CM

## 2023-12-12 NOTE — PROGRESS NOTES
Breast MRI recommended in April 2024 for follow up of likely benign lesions in the right breast (two oval, enhancing masses with circumscribed margins in the upper-outer quadrant of the right breast, posterior depth, measuring up to 15 mm in 17 mm seen on the breast MRI dated 10/26/2023).    Follow up recommended after MRI.

## 2023-12-14 ENCOUNTER — TELEPHONE (OUTPATIENT)
Dept: SURGERY | Facility: CLINIC | Age: 33
End: 2023-12-14

## 2024-01-18 ENCOUNTER — OFFICE VISIT (OUTPATIENT)
Dept: INTERNAL MEDICINE | Facility: CLINIC | Age: 34
End: 2024-01-18
Payer: MEDICAID

## 2024-01-18 VITALS
BODY MASS INDEX: 20.89 KG/M2 | HEIGHT: 66 IN | DIASTOLIC BLOOD PRESSURE: 86 MMHG | SYSTOLIC BLOOD PRESSURE: 129 MMHG | WEIGHT: 130 LBS | TEMPERATURE: 99 F | RESPIRATION RATE: 17 BRPM | HEART RATE: 83 BPM

## 2024-01-18 DIAGNOSIS — Z91.89 AT HIGH RISK FOR BREAST CANCER: ICD-10-CM

## 2024-01-18 DIAGNOSIS — Z00.00 WELLNESS EXAMINATION: Primary | ICD-10-CM

## 2024-01-18 DIAGNOSIS — I10 PRIMARY HYPERTENSION: ICD-10-CM

## 2024-01-18 DIAGNOSIS — Z11.9 SCREENING EXAMINATION FOR INFECTIOUS DISEASE: ICD-10-CM

## 2024-01-18 DIAGNOSIS — R53.83 OTHER FATIGUE: ICD-10-CM

## 2024-01-18 DIAGNOSIS — Z86.79 HISTORY OF ATRIAL FIBRILLATION: ICD-10-CM

## 2024-01-18 DIAGNOSIS — Z23 IMMUNIZATION DUE: ICD-10-CM

## 2024-01-18 PROCEDURE — 3008F BODY MASS INDEX DOCD: CPT | Mod: CPTII,,, | Performed by: NURSE PRACTITIONER

## 2024-01-18 PROCEDURE — 99395 PREV VISIT EST AGE 18-39: CPT | Mod: S$PBB,1D,GY, | Performed by: NURSE PRACTITIONER

## 2024-01-18 PROCEDURE — 99215 OFFICE O/P EST HI 40 MIN: CPT | Mod: PBBFAC | Performed by: NURSE PRACTITIONER

## 2024-01-18 PROCEDURE — 99202 OFFICE O/P NEW SF 15 MIN: CPT | Mod: S$PBB,25,, | Performed by: NURSE PRACTITIONER

## 2024-01-18 PROCEDURE — 3079F DIAST BP 80-89 MM HG: CPT | Mod: CPTII,,, | Performed by: NURSE PRACTITIONER

## 2024-01-18 PROCEDURE — 1159F MED LIST DOCD IN RCRD: CPT | Mod: CPTII,,, | Performed by: NURSE PRACTITIONER

## 2024-01-18 PROCEDURE — 90471 IMMUNIZATION ADMIN: CPT | Mod: PBBFAC

## 2024-01-18 PROCEDURE — 1160F RVW MEDS BY RX/DR IN RCRD: CPT | Mod: CPTII,,, | Performed by: NURSE PRACTITIONER

## 2024-01-18 PROCEDURE — 90686 IIV4 VACC NO PRSV 0.5 ML IM: CPT | Mod: PBBFAC

## 2024-01-18 PROCEDURE — 3074F SYST BP LT 130 MM HG: CPT | Mod: CPTII,,, | Performed by: NURSE PRACTITIONER

## 2024-01-18 RX ORDER — FLUCONAZOLE 150 MG/1
TABLET ORAL
COMMUNITY
End: 2024-01-18

## 2024-01-18 RX ORDER — KETOCONAZOLE 20 MG/G
CREAM TOPICAL
COMMUNITY

## 2024-01-18 RX ORDER — TRIAMCINOLONE ACETONIDE 1 MG/G
CREAM TOPICAL
COMMUNITY

## 2024-01-18 RX ORDER — HYDROCHLOROTHIAZIDE 25 MG/1
25 TABLET ORAL DAILY
Qty: 30 TABLET | Refills: 0 | Status: SHIPPED | OUTPATIENT
Start: 2024-01-18 | End: 2024-02-29 | Stop reason: SDUPTHER

## 2024-01-18 RX ORDER — KETOCONAZOLE 20 MG/ML
SHAMPOO, SUSPENSION TOPICAL
COMMUNITY

## 2024-01-18 RX ADMIN — INFLUENZA VIRUS VACCINE 0.5 ML: 15; 15; 15; 15 SUSPENSION INTRAMUSCULAR at 01:01

## 2024-01-18 NOTE — ASSESSMENT & PLAN NOTE
BP Readings from Last 3 Encounters:   01/18/24 129/86   03/28/23 122/65   03/16/23 137/83     Follow low sodium diet, < 2 gm/day (avoid high salty foods such as processed meats/ sausage/farooq/ sandwich meat, chips, pickles, cheese, crackers and soft drinks/ electrolyte replacement drinks).  Avoid tobacco/ alcohol use  Educated on health benefits of at least 5 days/ week of 30 minutes moderate intensity exercise (brisk walking) and 2 or more days/ week of muscle strength activities  Last dose of HCTZ 2 days ago  Previously on lisinopril for HTN but was dc s/t seborrheic dermatitis per her dermatologist recommendation

## 2024-01-18 NOTE — ASSESSMENT & PLAN NOTE
She reports history of A fib during her last pregnancy. She was on metoprolol ? Previously seen by Dr. Christianson in 2021 ? She states no longer on medication or has had follow up since. Denies ASA.  She is c/o fatigue and headaches in the last few weeks.   EKG today in clinic; outpatient Echo, Holter and referral to Cardiology ordered

## 2024-01-18 NOTE — ASSESSMENT & PLAN NOTE
She reports fatigue in the last month without associated change at home/ env/ stress/ work, etc.   Labs/ urine ordered   See above for cardiac work up

## 2024-01-18 NOTE — ASSESSMENT & PLAN NOTE
She is established with high risk breast clinic at [facility] [place]. Continue with recommended mammo/ mri breast.

## 2024-01-18 NOTE — PROGRESS NOTES
Мария L Pieter, NP   [facility]  [address] [facility] [address]      PATIENT NAME: Rebecca Humphrey  : 1990  DATE: 24  MRN: [id number]        Reason for Visit / Chief Complaint: Hypertension (Headaches within the last few weeks, didn't take HCTZ for 2 days) and Fatigue (X1 mth)       History of Present Illness / Problem Focused Workflow     Rebecca Humphrey presents to the clinic with Hypertension (Headaches within the last few weeks, didn't take HCTZ for 2 days) and Fatigue (X1 mth)     34 yo AAF to establish PCP care. Pmh includes HTN, history of A fib. 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 She is . Has 4 children, 7 yo, 6 yo, 3 yo twins. Wellness screenings UTD. She wants flu vaccine today. Tolerated before without s/e.     She is c/o headaches and fatigue over the last few weeks. She denies any change in activities/ home environment/ stress/ sleep. She does mention h/o A fib with her last pregnancy. Previously was seen by Dr. Christianson in  ? She states she was on metoprolol at one point but did not have to continue and has not had f/u with Cardiologist in a few years. Does notice HR elevated on her apple watch in triple digits. Denies identifying any associated SOB/ CP/ dizziness with episodes. Denies any fever, chills, dizziness, sore throat, sinus congestion, LAD, cough, SOB, CP, abdominal pain, n/v/d, hematochezia, hematuria, irregular menstrual cycles.           Review of Systems     Review of Systems   Constitutional:  Positive for fatigue.   HENT: Negative.     Eyes: Negative.    Respiratory: Negative.     Cardiovascular:  Positive for palpitations.   Gastrointestinal: Negative.    Endocrine: Negative.    Genitourinary: Negative.    Musculoskeletal: Negative.    Skin: Negative.    Allergic/Immunologic: Negative.    Neurological:  Positive for headaches.   Hematological:  Negative.    Psychiatric/Behavioral: Negative.         Medical / Social / Family History     No past medical history on file.     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)   Tobacco Use    Smoking status: Never    Smokeless tobacco: Never   Substance and Sexual Activity    Alcohol use: Never    Drug use: Never    Sexual activity: Yes     Partners: Male     Birth control/protection: See Surgical Hx, None     Comment:      Social Determinants of Health     Financial Resource Strain: Low Risk  (1/15/2024)    Overall Financial Resource Strain (CARDIA)     Difficulty of Paying Living Expenses: Not hard at all   Food Insecurity: No Food Insecurity (1/15/2024)    Hunger Vital Sign     Worried About Running Out of Food in the Last Year: Never true     Ran Out of Food in the Last Year: Never true   Transportation Needs: No Transportation Needs (1/15/2024)    PRAPARE - Transportation     Lack of Transportation (Medical): No     Lack of Transportation (Non-Medical): No   Physical Activity: Sufficiently Active (1/15/2024)    Exercise Vital Sign     Days of Exercise per Week: 7 days     Minutes of Exercise per Session: 150+ min   Stress: No Stress Concern Present (1/15/2024)    [employer] - Occupational Stress Questionnaire     Feeling of Stress : Not at all   Social Connections: Unknown (1/15/2024)    Social Connection and Isolation Panel [NHANES]     Frequency of Communication with Friends and Family: More than three times a week     Frequency of Social Gatherings with Friends and Family: Once a week     Active Member of Clubs or  "Organizations: No     Attends Club or Organization Meetings: Never     Marital Status:    Housing Stability: Low Risk  (1/15/2024)    Housing Stability Vital Sign     Unable to Pay for Housing in the Last Year: No     Number of Places Lived in the Last Year: 1     Unstable Housing in the Last Year: No        Family History   Problem Relation Age of Onset    Hypertension Mother     Heart failure Mother     Asthma Father         Prince Humphrey    COPD Father     Breast cancer Maternal Grandmother 61    Melanoma Maternal Aunt 61    Breast cancer Paternal Aunt 46    Pancreatic cancer Paternal Uncle 60    Diabetes Paternal Grandmother     Asthma Son     Asthma Paternal Uncle     Learning disabilities Brother         Medications and Allergies     Medications  Current Outpatient Medications   Medication Instructions    hydroCHLOROthiazide (HYDRODIURIL) 25 mg, Oral, Daily    ketoconazole (NIZORAL) 2 % cream ketoconazole 2 % topical cream   APPLY THIN LAYER TO FACE AND NECK TWICE DAILY FOR 1 TO 2 WEEKS    ketoconazole (NIZORAL) 2 % shampoo ketoconazole 2 % shampoo   SHAMPOO SCALP AND LEAVE IN FOR 3 TO 5 MINUTES THEN RINSE OUT EVERY OTHER DAY FOR 2 WEEKS THEN AS NEEDED    triamcinolone acetonide 0.1% (KENALOG) 0.1 % cream        Allergies  Review of patient's allergies indicates:  No Known Allergies    Physical Examination     Visit Vitals  /86 (BP Location: Left arm, Patient Position: Sitting, BP Method: Large (Automatic))   Pulse 83   Temp 98.7 °F (37.1 °C) (Oral)   Resp 17   Ht 5' 6" (1.676 m)   Wt 59 kg (130 lb)   LMP 01/15/2024   BMI 20.98 kg/m²       Physical Exam  Vitals and nursing note reviewed.   Constitutional:       General: She is not in acute distress.     Appearance: Normal appearance. She is not ill-appearing, toxic-appearing or diaphoretic.   HENT:      Head: Normocephalic.      Right Ear: Tympanic membrane, ear canal and external ear normal.      Left Ear: Tympanic membrane, ear canal and " external ear normal.      Nose: Nose normal.      Mouth/Throat:      Mouth: Mucous membranes are moist.   Eyes:      Extraocular Movements: Extraocular movements intact.      Conjunctiva/sclera: Conjunctivae normal.      Pupils: Pupils are equal, round, and reactive to light.   Neck:      Thyroid: No thyroid mass, thyromegaly or thyroid tenderness.      Vascular: No carotid bruit.   Cardiovascular:      Rate and Rhythm: Normal rate. Rhythm irregular.      Pulses: Normal pulses.   Pulmonary:      Effort: Pulmonary effort is normal. No respiratory distress.      Breath sounds: Normal breath sounds.   Abdominal:      General: Abdomen is flat. Bowel sounds are normal. There is no distension.      Palpations: Abdomen is soft. There is no mass.      Tenderness: There is no abdominal tenderness. There is no guarding or rebound.      Hernia: No hernia is present.   Musculoskeletal:         General: Normal range of motion.      Cervical back: Neck supple.      Right lower leg: No edema.      Left lower leg: No edema.   Lymphadenopathy:      Cervical: No cervical adenopathy.   Skin:     General: Skin is warm and dry.      Capillary Refill: Capillary refill takes less than 2 seconds.   Neurological:      Mental Status: She is alert and oriented to person, place, and time. Mental status is at baseline.      Motor: No weakness.      Coordination: Coordination normal.      Gait: Gait normal.   Psychiatric:         Mood and Affect: Mood normal.         Behavior: Behavior normal.         Thought Content: Thought content normal.         Judgment: Judgment normal.           Results         Assessment       ICD-10-CM ICD-9-CM   1. Wellness examination  Z00.00 V70.0   2. Immunization due  Z23 V05.9   3. Other fatigue  R53.83 780.79   4. Screening examination for infectious disease  Z11.9 V75.9   5. Primary hypertension  I10 401.9   6. History of atrial fibrillation  Z86.79 V12.59   7. At high risk for breast cancer  Z91.89 V49.89        Plan       Problem List Items Addressed This Visit          Cardiac/Vascular    History of atrial fibrillation    Current Assessment & Plan     She reports history of A fib during her last pregnancy. She was on metoprolol ? Previously seen by Dr. Christianson in 2021 ? She states no longer on medication or has had follow up since. Denies ASA.  She is c/o fatigue and headaches in the last few weeks.   EKG today in clinic; outpatient Echo, Holter and referral to Cardiology ordered          Relevant Orders    Ambulatory referral/consult to Cardiology    Holter monitor - 48 hour    Echo    SCHEDULED EKG 12-LEAD (to Sanjeev)    Hypertension    Current Assessment & Plan     BP Readings from Last 3 Encounters:   01/18/24 129/86   03/28/23 122/65   03/16/23 137/83   Follow low sodium diet, < 2 gm/day (avoid high salty foods such as processed meats/ sausage/farooq/ sandwich meat, chips, pickles, cheese, crackers and soft drinks/ electrolyte replacement drinks).  Avoid tobacco/ alcohol use  Educated on health benefits of at least 5 days/ week of 30 minutes moderate intensity exercise (brisk walking) and 2 or more days/ week of muscle strength activities  Last dose of HCTZ 2 days ago  Previously on lisinopril for HTN but was dc s/t seborrheic dermatitis per her dermatologist recommendation           Relevant Medications    hydroCHLOROthiazide (HYDRODIURIL) 25 MG tablet    Other Relevant Orders    CBC Auto Differential    Comprehensive Metabolic Panel    Hemoglobin A1C    Lipid Panel    TSH    Microalbumin/Creatinine Ratio, Urine       Renal/    At high risk for breast cancer    Current Assessment & Plan     She is established with high risk breast clinic at [facility]. Continue with recommended mammo/ mri breast.             Other    Other fatigue    Current Assessment & Plan     She reports fatigue in the last month without associated change at home/ env/ stress/ work, etc.   Labs/ urine ordered   See above for cardiac work up           Relevant Orders    Vitamin D    Vitamin B12    HCG, Serum, Qualitative     Other Visit Diagnoses       Wellness examination    -  Primary  Avoid tobacco/ alcohol/ drugs  Regular exercise as tolerated at least 5 days/ week of 30 minutes moderate intensity exercise (brisk walking) and 2 or more days/ week of muscle strength activities (as tolerated).  Eat well balanced diet of fresh fruits/ vegetables, whole grains, lean meats and limit high carbohydrate foods.   Healthy lifestyle habits.  Regular eye/ dental exams as recommended    Screenings:   PAP- Dr. YOSHI Upton  [facility] 12/7/23  [facility]    Vaccines as recommended: flu today per pt request, tolerated before without s/e      Relevant Orders    CBC Auto Differential    Comprehensive Metabolic Panel    Hemoglobin A1C    Lipid Panel    TSH    Microalbumin/Creatinine Ratio, Urine    Immunization due        Relevant Medications    influenza (QUADRIVALENT PF) vaccine 0.5 mL    Screening examination for infectious disease        Relevant Orders    HIV 1/2 Ag/Ab (4th Gen)    Hepatitis C Antibody    Hepatitis B Surface Antigen    SYPHILIS ANTIBODY (WITH REFLEX RPR)            Future Appointments   Date Time Provider Department Center   2/5/2024  9:30 AM [facility] MAMMO1 [facility]   4/3/2024  1:45 PM Stone Upton MD [facility]   4/29/2024 11:00 AM Ayana Quezada PA-C [facility]      Fasting/ wellness labs today  Follow up in about 6 weeks (around 2/29/2024) for echo/ holter results.    Signature:  Мария Stapleton NP  [facility]  [address] [facility] [address]    Date of encounter: 1/18/24

## 2024-02-20 ENCOUNTER — HOSPITAL ENCOUNTER (OUTPATIENT)
Dept: CARDIOLOGY | Facility: HOSPITAL | Age: 34
Discharge: HOME | End: 2024-02-20
Attending: NURSE PRACTITIONER
Payer: MEDICAID

## 2024-02-20 DIAGNOSIS — Z86.79 HISTORY OF ATRIAL FIBRILLATION: ICD-10-CM

## 2024-02-20 PROCEDURE — 93225 XTRNL ECG REC<48 HRS REC: CPT

## 2024-02-23 LAB
OHS CV EVENT MONITOR DAY: 0
OHS CV HOLTER LENGTH DECIMAL HOURS: 48
OHS CV HOLTER LENGTH HOURS: 48
OHS CV HOLTER LENGTH MINUTES: 0

## 2024-02-29 ENCOUNTER — LAB VISIT (OUTPATIENT)
Dept: LAB | Facility: HOSPITAL | Age: 34
End: 2024-02-29
Attending: NURSE PRACTITIONER
Payer: MEDICAID

## 2024-02-29 ENCOUNTER — OFFICE VISIT (OUTPATIENT)
Dept: CARDIOLOGY | Facility: CLINIC | Age: 34
End: 2024-02-29
Payer: MEDICAID

## 2024-02-29 ENCOUNTER — OFFICE VISIT (OUTPATIENT)
Dept: INTERNAL MEDICINE | Facility: CLINIC | Age: 34
End: 2024-02-29
Payer: MEDICAID

## 2024-02-29 VITALS
HEIGHT: 66 IN | TEMPERATURE: 98 F | HEART RATE: 83 BPM | BODY MASS INDEX: 21.44 KG/M2 | SYSTOLIC BLOOD PRESSURE: 126 MMHG | WEIGHT: 133.38 LBS | DIASTOLIC BLOOD PRESSURE: 85 MMHG | RESPIRATION RATE: 16 BRPM

## 2024-02-29 VITALS
TEMPERATURE: 99 F | HEART RATE: 72 BPM | DIASTOLIC BLOOD PRESSURE: 88 MMHG | HEIGHT: 67 IN | RESPIRATION RATE: 18 BRPM | BODY MASS INDEX: 20.76 KG/M2 | SYSTOLIC BLOOD PRESSURE: 138 MMHG | WEIGHT: 132.25 LBS | OXYGEN SATURATION: 100 %

## 2024-02-29 DIAGNOSIS — I10 PRIMARY HYPERTENSION: ICD-10-CM

## 2024-02-29 DIAGNOSIS — Z86.79 HISTORY OF ATRIAL FIBRILLATION: ICD-10-CM

## 2024-02-29 DIAGNOSIS — D64.89 ANEMIA DUE TO OTHER CAUSE, NOT CLASSIFIED: ICD-10-CM

## 2024-02-29 DIAGNOSIS — I10 PRIMARY HYPERTENSION: Primary | ICD-10-CM

## 2024-02-29 DIAGNOSIS — R53.83 OTHER FATIGUE: ICD-10-CM

## 2024-02-29 DIAGNOSIS — D64.89 ANEMIA DUE TO OTHER CAUSE, NOT CLASSIFIED: Primary | ICD-10-CM

## 2024-02-29 LAB
ACANTHOCYTES (OLG): SLIGHT
BASOPHILS # BLD AUTO: 0.04 X10(3)/MCL
BASOPHILS NFR BLD AUTO: 0.9 %
EOSINOPHIL # BLD AUTO: 0.02 X10(3)/MCL (ref 0–0.9)
EOSINOPHIL NFR BLD AUTO: 0.5 %
ERYTHROCYTE [DISTWIDTH] IN BLOOD BY AUTOMATED COUNT: 12.9 % (ref 11.5–17)
FERRITIN SERPL-MCNC: 10.85 NG/ML (ref 4.63–204)
HCT VFR BLD AUTO: 34.2 % (ref 37–47)
HGB BLD-MCNC: 10.9 G/DL (ref 12–16)
IMM GRANULOCYTES # BLD AUTO: 0.02 X10(3)/MCL (ref 0–0.04)
IMM GRANULOCYTES NFR BLD AUTO: 0.5 %
IRON SATN MFR SERPL: 26 % (ref 20–50)
IRON SERPL-MCNC: 103 UG/DL (ref 50–170)
LYMPHOCYTES # BLD AUTO: 1.66 X10(3)/MCL (ref 0.6–4.6)
LYMPHOCYTES NFR BLD AUTO: 38.4 %
MCH RBC QN AUTO: 29 PG (ref 27–31)
MCHC RBC AUTO-ENTMCNC: 31.9 G/DL (ref 33–36)
MCV RBC AUTO: 91 FL (ref 80–94)
MONOCYTES # BLD AUTO: 0.39 X10(3)/MCL (ref 0.1–1.3)
MONOCYTES NFR BLD AUTO: 9 %
NEUTROPHILS # BLD AUTO: 2.19 X10(3)/MCL (ref 2.1–9.2)
NEUTROPHILS NFR BLD AUTO: 50.7 %
NRBC BLD AUTO-RTO: 0 %
OVALOCYTES (OLG): SLIGHT
PLATELET # BLD AUTO: 153 X10(3)/MCL (ref 130–400)
PLATELET # BLD EST: ADEQUATE 10*3/UL
PLATELETS.RETICULATED NFR BLD AUTO: 18.5 % (ref 0.9–11.2)
PMV BLD AUTO: 12.7 FL (ref 7.4–10.4)
RBC # BLD AUTO: 3.76 X10(6)/MCL (ref 4.2–5.4)
RET# (OHS): 0.05 X10E6/UL (ref 0.02–0.08)
RETICULOCYTE COUNT AUTOMATED (OLG): 1.4 % (ref 1.1–2.1)
TIBC SERPL-MCNC: 290 UG/DL (ref 70–310)
TIBC SERPL-MCNC: 393 UG/DL (ref 250–450)
TRANSFERRIN SERPL-MCNC: 363 MG/DL (ref 180–382)
WBC # SPEC AUTO: 4.32 X10(3)/MCL (ref 4.5–11.5)

## 2024-02-29 PROCEDURE — 3008F BODY MASS INDEX DOCD: CPT | Mod: CPTII,,, | Performed by: NURSE PRACTITIONER

## 2024-02-29 PROCEDURE — 3044F HG A1C LEVEL LT 7.0%: CPT | Mod: CPTII,,, | Performed by: NURSE PRACTITIONER

## 2024-02-29 PROCEDURE — 3066F NEPHROPATHY DOC TX: CPT | Mod: CPTII,,,

## 2024-02-29 PROCEDURE — 83540 ASSAY OF IRON: CPT

## 2024-02-29 PROCEDURE — 3079F DIAST BP 80-89 MM HG: CPT | Mod: CPTII,,, | Performed by: NURSE PRACTITIONER

## 2024-02-29 PROCEDURE — 3008F BODY MASS INDEX DOCD: CPT | Mod: CPTII,,,

## 2024-02-29 PROCEDURE — 82728 ASSAY OF FERRITIN: CPT

## 2024-02-29 PROCEDURE — 1160F RVW MEDS BY RX/DR IN RCRD: CPT | Mod: CPTII,,, | Performed by: NURSE PRACTITIONER

## 2024-02-29 PROCEDURE — 99215 OFFICE O/P EST HI 40 MIN: CPT | Mod: PBBFAC,27

## 2024-02-29 PROCEDURE — 3060F POS MICROALBUMINURIA REV: CPT | Mod: CPTII,,,

## 2024-02-29 PROCEDURE — 99214 OFFICE O/P EST MOD 30 MIN: CPT | Mod: S$PBB,,, | Performed by: NURSE PRACTITIONER

## 2024-02-29 PROCEDURE — 3075F SYST BP GE 130 - 139MM HG: CPT | Mod: CPTII,,,

## 2024-02-29 PROCEDURE — 3044F HG A1C LEVEL LT 7.0%: CPT | Mod: CPTII,,,

## 2024-02-29 PROCEDURE — 3074F SYST BP LT 130 MM HG: CPT | Mod: CPTII,,, | Performed by: NURSE PRACTITIONER

## 2024-02-29 PROCEDURE — 85045 AUTOMATED RETICULOCYTE COUNT: CPT

## 2024-02-29 PROCEDURE — 99204 OFFICE O/P NEW MOD 45 MIN: CPT | Mod: S$PBB,,,

## 2024-02-29 PROCEDURE — 85025 COMPLETE CBC W/AUTO DIFF WBC: CPT

## 2024-02-29 PROCEDURE — 99213 OFFICE O/P EST LOW 20 MIN: CPT | Mod: PBBFAC | Performed by: NURSE PRACTITIONER

## 2024-02-29 PROCEDURE — 3079F DIAST BP 80-89 MM HG: CPT | Mod: CPTII,,,

## 2024-02-29 PROCEDURE — 3060F POS MICROALBUMINURIA REV: CPT | Mod: CPTII,,, | Performed by: NURSE PRACTITIONER

## 2024-02-29 PROCEDURE — 1159F MED LIST DOCD IN RCRD: CPT | Mod: CPTII,,,

## 2024-02-29 PROCEDURE — 3066F NEPHROPATHY DOC TX: CPT | Mod: CPTII,,, | Performed by: NURSE PRACTITIONER

## 2024-02-29 PROCEDURE — 36415 COLL VENOUS BLD VENIPUNCTURE: CPT

## 2024-02-29 PROCEDURE — 1159F MED LIST DOCD IN RCRD: CPT | Mod: CPTII,,, | Performed by: NURSE PRACTITIONER

## 2024-02-29 PROCEDURE — 1160F RVW MEDS BY RX/DR IN RCRD: CPT | Mod: CPTII,,,

## 2024-02-29 RX ORDER — HYDROCHLOROTHIAZIDE 25 MG/1
25 TABLET ORAL DAILY
Qty: 30 TABLET | Refills: 5 | Status: SHIPPED | OUTPATIENT
Start: 2024-02-29

## 2024-02-29 NOTE — ASSESSMENT & PLAN NOTE
Vit D lower end of normal. Encouraged daily vitamin D 3 supplementation.   Vit B 12/ TSH wnl  CBC with anemia; see anemia

## 2024-02-29 NOTE — PROGRESS NOTES
CHIEF COMPLAINT: No chief complaint on file.                                                 HPI:  Rebecca Humphrey 33 y.o. female with a past medical history of hypertension and paroxysmal AFib in pregnancy presents to Cardiology Clinic today to establish care.  She was previously seen by Dr. Christianson at [facility].  She has had echocardiogram, stress test, and Holter monitor testing in the past.  All unremarkable.  See full report below.  Today the patient states that she is feeling well overall.  She endorses atypical chest pain that occurs when she lays on her left side that is also accompanied by some degree of shortness a breath.  She denies any exertional symptoms such as chest pain, SOB, ANDREA, lightheadedness, dizziness, syncope, claudication symptoms, or peripheral edema.  She endorses occasional palpitations but states that they are not very bothersome.  She states that they occur sporadically both at rest and with exertion.  She is able to complete her ADLs without any issues or ischemic symptoms.  She reports compliance with all her current medications.  She denies any tobacco or other illicit drug use.  She states that she follows a mostly heart healthy diet and tries to limit the amount of sodium in her diet.                                                                                                                                                                                                                                                                                                                                                                                                                                                                                      CARDIAC TESTING:  Holter Monitor - 48 Hour 2.20.24  -Sinus rhythm, average HR 81 bpm, minimum 54 bpm and maximum 132 bpm.  - No profound pauses.  - Rare isolated PACs. No SVT episodes.  - Occasional isolated PVCs. Rare ventricular couplets. No  VT episodes.  - No atrial fibrillation/atrial flutter.  - No reported symptoms.    Outpatient Telemetry 10.27.20  1. This is a good quality study.   2. Predominant rhythm is normal sinus rhythm.   3. The minimum heart rate recorded was 49 beats / minute (sinus bradycardia). The maximum heart rate is 156 beats / minute (sinus tachycardia). The mean heart rate is 76 beats / minute.   4. No evidence of AV block is noted.   5. Rare premature atrial contractions noted.   6. No evidence of supraventricular tachycardia is noted.  7. Moderate premature ventricular contractions noted.    8. Rare non sustained bigeminy and salvos are noted.   9. Rare couplets are noted.   10. No evidence of ventricular tachycardia is noted.  11. No evidence of supraventricular arrhythmia is noted.  12. No evidence of ventricular arrhythmia is noted.  13. No pauses were noted.     Echo 10.27.20  1. The study quality is good.   2. The left ventricle is normal in size. Global left ventricular systolic function is borderline normal. The left ventricular ejection fraction is 50%. GLS -14.9%.  3. Mitral valve prolapse is noted. The prolapse is mild. Both the anterior and posterior mitral leaflets are prolapsed.  4.  Mild to moderate (1-2+) mitral regurgitation. Mild to moderate (1-2+) tricuspid regurgitation. Mild (1+) pulmonic regurgitation.  5. The pulmonary artery systolic pressure is 23 mmHg.     Exercise Stress Test 10.27.20  1. Stress EKG is normal.   2. Maximal exercise treadmill test (MPHR : 89 %).  3. The functional capacity is good (10.7 METs).  4. The heart rate recovery is normal.   5. The study quality is average.         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)   Tobacco Use    Smoking status: Never    Smokeless tobacco: Never   Substance and Sexual Activity    Alcohol use: Never    Drug use: Never    Sexual activity: Yes     Partners: Male     Birth control/protection: See Surgical Hx, None     Comment:      Social Determinants of Health     Financial Resource Strain: Low Risk  (1/15/2024)    Overall Financial Resource Strain (CARDIA)     Difficulty of Paying Living Expenses: Not hard at all   Food Insecurity: No Food Insecurity (1/15/2024)    Hunger Vital Sign     Worried About Running Out of Food in the Last Year: Never true     Ran Out of Food in the Last Year: Never true   Transportation Needs: No Transportation Needs (1/15/2024)    PRAPARE - Transportation     Lack of Transportation (Medical): No     Lack of Transportation (Non-Medical): No   Physical Activity: Inactive (2024)    Exercise Vital Sign     Days of Exercise per Week: 0 days     Minutes of Exercise per Session: 0 min   Stress: No Stress Concern Present (1/15/2024)    [employer] - Occupational Stress Questionnaire     Feeling of Stress : Not at all   Social Connections: Unknown (1/15/2024)    Social Connection and Isolation Panel [NHANES]     Frequency of Communication with Friends and Family: More than three times a week     Frequency of Social Gatherings with Friends and Family: Once a week     Active Member of Clubs or Organizations: No     Attends Club or Organization Meetings: Never     Marital Status:    Housing Stability: Low Risk  (1/15/2024)    Housing Stability Vital Sign     Unable to Pay for Housing in the Last Year: No      Number of Places Lived in the Last Year: 1     Unstable Housing in the Last Year: No        Family History   Problem Relation Age of Onset    Hypertension Mother     Heart failure Mother     Asthma Father         Prince Humphrey    COPD Father     Breast cancer Maternal Grandmother 61    Melanoma Maternal Aunt 61    Breast cancer Paternal Aunt 46    Pancreatic cancer Paternal Uncle 60    Diabetes Paternal Grandmother     Asthma Son     Asthma Paternal Uncle     Learning disabilities Brother      Review of patient's allergies indicates:  No Known Allergies      ROS:  Review of Systems   Constitutional: Negative.    HENT: Negative.     Eyes: Negative.    Respiratory: Negative.  Negative for shortness of breath.    Cardiovascular: Negative.  Negative for chest pain (Aytpical, positional), palpitations (Occasional), orthopnea, claudication, leg swelling and PND.   Gastrointestinal: Negative.    Genitourinary: Negative.    Musculoskeletal: Negative.    Skin: Negative.    Neurological: Negative.    Endo/Heme/Allergies: Negative.    Psychiatric/Behavioral: Negative.                                                                                                                                                                                  Negative except as stated in the history of present illness. See HPI for details.    PHYSICAL EXAM:  Visit Vitals  LMP 02/09/2024       Physical Exam  HENT:      Head: Normocephalic.      Nose: Nose normal.      Mouth/Throat:      Mouth: Mucous membranes are moist.   Eyes:      Extraocular Movements: Extraocular movements intact.   Cardiovascular:      Rate and Rhythm: Normal rate and regular rhythm.      Pulses: Normal pulses.      Heart sounds: Normal heart sounds.   Pulmonary:      Effort: Pulmonary effort is normal.   Abdominal:      General: There is no distension.   Musculoskeletal:         General: Normal range of motion.      Cervical back: Normal range of motion.      Right  lower leg: No edema.      Left lower leg: No edema.   Skin:     General: Skin is warm.   Neurological:      General: No focal deficit present.      Mental Status: She is alert.   Psychiatric:         Mood and Affect: Mood normal.         Current Outpatient Medications   Medication Instructions    hydroCHLOROthiazide (HYDRODIURIL) 25 mg, Oral, Daily    ketoconazole (NIZORAL) 2 % cream ketoconazole 2 % topical cream   APPLY THIN LAYER TO FACE AND NECK TWICE DAILY FOR 1 TO 2 WEEKS    ketoconazole (NIZORAL) 2 % shampoo ketoconazole 2 % shampoo   SHAMPOO SCALP AND LEAVE IN FOR 3 TO 5 MINUTES THEN RINSE OUT EVERY OTHER DAY FOR 2 WEEKS THEN AS NEEDED    triamcinolone acetonide 0.1% (KENALOG) 0.1 % cream         All medications, laboratory studies, cardiac diagnostic imaging reviewed.     Lab Results   Component Value Date    .00 01/18/2024    TRIG 59 01/18/2024    CREATININE 0.77 01/18/2024    MG 1.9 03/22/2020    K 3.7 01/18/2024        ASSESSMENT/PLAN:    Paroxysmal Afib  - Patient endorses history of one episode of AFIB during pregnancy approximately 4 years ago  - No episodes since then   - Reports occasional palpitations but states that they are not very bothersome- describes as heart racing  - Denies any lightheadedness, dizziness, or syncope  - 48 hour Holter monitor completed in February 2024 revealed underlying sinus rhythm, average heart rate 81, no profound pauses, rare PACs, no SVT, occasional isolated PVCs, rare ventricular couplets, no VT, no AFib no a flutter.  See full report above  - 30 day cardiac event monitor completed in 2020 was unremarkable.  See full report above  - CHADsVASc - 2 Points - 2.2% Stroke Risk per Year (Female, HTN)  - Given isolated event and asymptomatic, no indication for AC at this time. Case discussed with staff cardiologist, Dr. Andrews.     HTN  - BP well controlled today - 138/88  - Continue HCTZ 25 MG   - Counseled on the importance of following a low sodium, heart  healthy diet and exercise as tolerated    Atypical Chest Pain/SOB  - Occurs occasionally when laying on left side- states that it is positional in nature and reproducible  - Nonexertional  - Stress test completed in 2020 unremarkable, no indication of infarct or ischemia  - Echocardiogram completed in 2020 unremarkable, EF 50%   - No indication for further testing at this time   - If patient continues to complain of symptoms may consider repeat echocardiogram      Follow up in cardiology clinic in 6 months or sooner if needed   Follow up with PCP as directed

## 2024-02-29 NOTE — ASSESSMENT & PLAN NOTE
Holter monitor negative for arrhythmia/ A fib   Echo denied per insurance. Need copy of previous report.  Has appt with Cardiology this morning

## 2024-02-29 NOTE — PROGRESS NOTES
Мария L Pieter, NP   [facility]  [address] [address]      PATIENT NAME: Rebecca Humphrey  : 1990  DATE: 24  MRN: [id number]        History of Present Illness / Problem Focused Workflow     Rebecca Humphrey presents to the clinic with Results     HPI: Initial visit 24: 32 yo AAF to establish PCP care. Pmh includes HTN, history of A fib. 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 Currently in online school. She is . Has 4 children, 9 yo, 6 yo, 3 yo twins. Wellness screenings UTD. She wants flu vaccine today. Tolerated before without s/e.      She is c/o headaches and fatigue over the last few weeks. She denies any change in activities/ home environment/ stress/ sleep. She does mention h/o A fib with her last pregnancy. Previously was seen by Dr. Christianson in  ? She states she was on metoprolol at one point but did not have to continue and has not had f/u with Cardiologist in a few years. Does notice HR elevated on her apple watch in triple digits. Denies identifying any associated SOB/ CP/ dizziness with episodes. Denies any fever, chills, dizziness, sore throat, sinus congestion, LAD, cough, SOB, CP, abdominal pain, n/v/d, hematochezia, hematuria, irregular menstrual cycles.     24: Pt for follow up after establishing PCP care and to discuss results. Labs reviewed. Anemia noted. Does endorse irregular menstrual cycles. Has Cardiology appointment this morning. Echo denied per insurance. Need copy of outside report/ previous completed approx 4 years ago with [facility]. Denies any changes in her condition.     Other providers:   Dr. Upton, GYN    Review of Systems     Review of Systems   Constitutional: Negative.    HENT: Negative.     Eyes: Negative.    Respiratory: Negative.     Cardiovascular: Negative.    Gastrointestinal: Negative.    Endocrine: Negative.    Genitourinary: Negative.     Musculoskeletal: Negative.    Skin: Negative.    Allergic/Immunologic: Negative.    Neurological: Negative.    Hematological: Negative.    Psychiatric/Behavioral: Negative.         Medical / Social / Family History     No past medical history on file.     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)   Tobacco Use    Smoking status: Never    Smokeless tobacco: Never   Substance and Sexual Activity    Alcohol use: Never    Drug use: Never    Sexual activity: Yes     Partners: Male     Birth control/protection: See Surgical Hx, None     Comment:      Social Determinants of Health     Financial Resource Strain: Low Risk  (1/15/2024)    Overall Financial Resource Strain (CARDIA)     Difficulty of Paying Living Expenses: Not hard at all   Food Insecurity: No Food Insecurity (1/15/2024)    Hunger Vital Sign     Worried About Running Out of Food in the Last Year: Never true     Ran Out of Food in the Last Year: Never true   Transportation Needs: No Transportation Needs (1/15/2024)    PRAPARE - Transportation     Lack of Transportation (Medical): No     Lack of Transportation (Non-Medical): No   Physical Activity: Inactive (2024)    Exercise Vital Sign     Days of Exercise per Week: 0 days     Minutes of Exercise per Session: 0 min   Stress: No Stress Concern Present (1/15/2024)    [place] [employer] - Occupational Stress Questionnaire     Feeling of Stress : Not at all   Social Connections: Unknown (1/15/2024)    Social Connection and Isolation Panel [NHANES]     Frequency of Communication with Friends and Family: More than three times a week  "    Frequency of Social Gatherings with Friends and Family: Once a week     Active Member of Clubs or Organizations: No     Attends Club or Organization Meetings: Never     Marital Status:    Housing Stability: Low Risk  (1/15/2024)    Housing Stability Vital Sign     Unable to Pay for Housing in the Last Year: No     Number of Places Lived in the Last Year: 1     Unstable Housing in the Last Year: No        Family History   Problem Relation Age of Onset    Hypertension Mother     Heart failure Mother     Asthma Father         Prince Humphrey    COPD Father     Breast cancer Maternal Grandmother 61    Melanoma Maternal Aunt 61    Breast cancer Paternal Aunt 46    Pancreatic cancer Paternal Uncle 60    Diabetes Paternal Grandmother     Asthma Son     Asthma Paternal Uncle     Learning disabilities Brother         Medications and Allergies     Medications  Current Outpatient Medications   Medication Instructions    hydroCHLOROthiazide (HYDRODIURIL) 25 mg, Oral, Daily    ketoconazole (NIZORAL) 2 % cream ketoconazole 2 % topical cream   APPLY THIN LAYER TO FACE AND NECK TWICE DAILY FOR 1 TO 2 WEEKS    ketoconazole (NIZORAL) 2 % shampoo ketoconazole 2 % shampoo   SHAMPOO SCALP AND LEAVE IN FOR 3 TO 5 MINUTES THEN RINSE OUT EVERY OTHER DAY FOR 2 WEEKS THEN AS NEEDED    triamcinolone acetonide 0.1% (KENALOG) 0.1 % cream        Allergies  Review of patient's allergies indicates:  No Known Allergies    Physical Examination     Visit Vitals  /85 (BP Location: Left arm, Patient Position: Sitting, BP Method: Large (Automatic))   Pulse 83   Temp 98 °F (36.7 °C) (Oral)   Resp 16   Ht 5' 6" (1.676 m)   Wt 60.5 kg (133 lb 6.4 oz)   LMP 02/09/2024   BMI 21.53 kg/m²       Physical Exam  Constitutional:       General: She is not in acute distress.     Appearance: Normal appearance. She is not ill-appearing or diaphoretic.   HENT:      Head: Normocephalic.   Cardiovascular:      Rate and Rhythm: Normal rate.   Pulmonary: "      Effort: Pulmonary effort is normal. No respiratory distress.   Skin:     General: Skin is warm and dry.   Neurological:      Mental Status: She is alert and oriented to person, place, and time. Mental status is at baseline.      Coordination: Coordination normal.      Gait: Gait normal.   Psychiatric:         Mood and Affect: Mood normal.         Behavior: Behavior normal.         Thought Content: Thought content normal.         Judgment: Judgment normal.           Results     Lab Results   Component Value Date    WBC 4.91 01/18/2024    RBC 3.95 (L) 01/18/2024    HGB 11.8 (L) 01/18/2024    HCT 35.9 (L) 01/18/2024    MCV 90.9 01/18/2024    MCH 29.9 01/18/2024    MCHC 32.9 (L) 01/18/2024    RDW 13.1 01/18/2024     01/18/2024    MPV 12.8 (H) 01/18/2024     Sodium Level   Date Value Ref Range Status   01/18/2024 142 136 - 145 mmol/L Final     Potassium Level   Date Value Ref Range Status   01/18/2024 3.7 3.5 - 5.1 mmol/L Final     Carbon Dioxide   Date Value Ref Range Status   01/18/2024 25 22 - 29 mmol/L Final     Blood Urea Nitrogen   Date Value Ref Range Status   01/18/2024 7.7 7.0 - 18.7 mg/dL Final     Creatinine   Date Value Ref Range Status   01/18/2024 0.77 0.55 - 1.02 mg/dL Final     Calcium Level Total   Date Value Ref Range Status   01/18/2024 9.5 8.4 - 10.2 mg/dL Final     Albumin Level   Date Value Ref Range Status   01/18/2024 4.3 3.5 - 5.0 g/dL Final     Bilirubin Total   Date Value Ref Range Status   01/18/2024 0.7 <=1.5 mg/dL Final     Alkaline Phosphatase   Date Value Ref Range Status   01/18/2024 42 40 - 150 unit/L Final     Aspartate Aminotransferase   Date Value Ref Range Status   01/18/2024 14 5 - 34 unit/L Final     Alanine Aminotransferase   Date Value Ref Range Status   01/18/2024 9 0 - 55 unit/L Final     Estimated GFR-Non    Date Value Ref Range Status   04/26/2021 >105 >>=90 mL/min/1.73 m2 Final     Lab Results   Component Value Date    CHOL 188 01/18/2024     Lab  "Results   Component Value Date    HDL 64 (H) 01/18/2024     No results found for: "LDLCALC"  Lab Results   Component Value Date    TRIG 59 01/18/2024     No results found for: "CHOLHDL"  Lab Results   Component Value Date    TSH 0.917 01/18/2024     Lab Results   Component Value Date    PHUR 7.0 04/26/2021    SPECGRAV 1.025 06/10/2020    PROTEINUA Negative 04/26/2021    GLUCUA Negative 04/26/2021    KETONESU Negative 04/26/2021    OCCULTUA 0.2 04/26/2021    NITRITE Negative 04/26/2021    LEUKOCYTESUR Negative 04/26/2021     Lab Results   Component Value Date    HGBA1C 4.8 01/18/2024     No results found for: "MICROALBUR", "BGDL81QLC"   No results found for this or any previous visit.     Holter monitor 2/20/24    Assessment       ICD-10-CM ICD-9-CM   1. Anemia due to other cause, not classified  D64.89 285.8   2. Primary hypertension  I10 401.9   3. History of atrial fibrillation  Z86.79 V12.59   4. Other fatigue  R53.83 780.79       Plan       Problem List Items Addressed This Visit          Cardiac/Vascular    History of atrial fibrillation    Current Assessment & Plan     Holter monitor negative for arrhythmia/ A fib   Echo denied per insurance. Need copy of previous report.  Has appt with Cardiology this morning          Hypertension    Relevant Medications    hydroCHLOROthiazide (HYDRODIURIL) 25 MG tablet       Oncology    Other specified anemias - Primary    Current Assessment & Plan     CBC with chronic findings of anemia  Further labs today  Will notify of results and likely will need iron supplementation          Relevant Orders    Iron and TIBC    Ferritin    Path Review, Peripheral Smear    Reticulocytes    CBC Auto Differential       Other    Other fatigue    Current Assessment & Plan     Vit D lower end of normal. Encouraged daily vitamin D 3 supplementation.   Vit B 12/ TSH wnl  CBC with anemia; see anemia            Future Appointments   Date Time Provider Department Center   2/29/2024 11:20 AM LAB, " [facility]   2/29/2024 12:45 PM Dory Ann PA-C [facility] CARD [place] [facility]   4/3/2024  1:45 PM Stone Upton MD [facility]   4/29/2024 11:00 AM [facility]   5/29/2024 11:00 AM Мария Stapleton [facility]        Follow up in about 3 months (around 5/29/2024) for anemia with labs before visit.    Signature:  Мария Stapleton NP  [facility]  [address] [address]    Date of encounter: 2/29/24

## 2024-02-29 NOTE — ASSESSMENT & PLAN NOTE
CBC with chronic findings of anemia  Further labs today  Will notify of results and likely will need iron supplementation

## 2024-03-25 ENCOUNTER — OFFICE VISIT (OUTPATIENT)
Dept: INTERNAL MEDICINE | Facility: CLINIC | Age: 34
End: 2024-03-25
Payer: MEDICAID

## 2024-03-25 VITALS
HEIGHT: 67 IN | HEART RATE: 78 BPM | SYSTOLIC BLOOD PRESSURE: 116 MMHG | DIASTOLIC BLOOD PRESSURE: 79 MMHG | TEMPERATURE: 98 F | BODY MASS INDEX: 20.84 KG/M2 | WEIGHT: 132.81 LBS | RESPIRATION RATE: 20 BRPM

## 2024-03-25 DIAGNOSIS — I10 PRIMARY HYPERTENSION: Primary | ICD-10-CM

## 2024-03-25 DIAGNOSIS — D64.89 ANEMIA DUE TO OTHER CAUSE, NOT CLASSIFIED: ICD-10-CM

## 2024-03-25 PROCEDURE — 3060F POS MICROALBUMINURIA REV: CPT | Mod: CPTII,,, | Performed by: NURSE PRACTITIONER

## 2024-03-25 PROCEDURE — 3078F DIAST BP <80 MM HG: CPT | Mod: CPTII,,, | Performed by: NURSE PRACTITIONER

## 2024-03-25 PROCEDURE — 3074F SYST BP LT 130 MM HG: CPT | Mod: CPTII,,, | Performed by: NURSE PRACTITIONER

## 2024-03-25 PROCEDURE — 1159F MED LIST DOCD IN RCRD: CPT | Mod: CPTII,,, | Performed by: NURSE PRACTITIONER

## 2024-03-25 PROCEDURE — 3008F BODY MASS INDEX DOCD: CPT | Mod: CPTII,,, | Performed by: NURSE PRACTITIONER

## 2024-03-25 PROCEDURE — 3044F HG A1C LEVEL LT 7.0%: CPT | Mod: CPTII,,, | Performed by: NURSE PRACTITIONER

## 2024-03-25 PROCEDURE — 3066F NEPHROPATHY DOC TX: CPT | Mod: CPTII,,, | Performed by: NURSE PRACTITIONER

## 2024-03-25 PROCEDURE — 99213 OFFICE O/P EST LOW 20 MIN: CPT | Mod: S$PBB,,, | Performed by: NURSE PRACTITIONER

## 2024-03-25 PROCEDURE — 99214 OFFICE O/P EST MOD 30 MIN: CPT | Mod: PBBFAC | Performed by: NURSE PRACTITIONER

## 2024-03-25 RX ORDER — AMLODIPINE BESYLATE 5 MG/1
5 TABLET ORAL
COMMUNITY
Start: 2024-03-24

## 2024-03-25 NOTE — PROGRESS NOTES
Answers submitted by the patient for this visit:  High Blood Pressure Questionnaire (Submitted on 3/24/2024)  Chief Complaint: Hypertension  Chronicity: new  Onset: more than 1 year ago  Progression since onset: waxing and waning  Condition status: controlled  anxiety: No  blurred vision: No  chest pain: No  headaches: Yes  malaise/fatigue: Yes  neck pain: No  orthopnea: No  palpitations: No  peripheral edema: No  PND: No  shortness of breath: No  sweats: No  Compliance problems: no compliance problems  Past treatments: angiotensin blockers, diuretics  Improvement on treatment: moderate    Мария Stapleton NP   [facility]  [address] [address]      PATIENT NAME: Rebecca Humphrey  : 1990  DATE: 3/25/24  MRN: [id number]        History of Present Illness / Problem Focused Workflow     Rebecca Humphrey presents to the clinic with Hypertension     HPI: 34 yo AAF for concerns of blood pressure. She states Friday night went out to eat and had a few alcohol drinks and awoke Saturday morning feeling dizzy with headache. She did not feel like it was a hangover because she does not think she drank that many drinks. She checked her blood pressure and it was around 150/100s per home machine. She states she took dose of hctz and hours later BP increased to 180s/120s. [place] [facility] and was given dose of amlodipine 5 mg. She states a few hours later BP dropped to less than 100/70s. She denies CP, SOB, palpitations or continued dizziness or headaches. She had visit with Cardiologist about a month ago and was told her testing/ holter monitor was good. Denies any other concerns/ complaints.    Review of Systems     Review of Systems   Constitutional: Negative.    HENT: Negative.     Eyes: Negative.    Respiratory: Negative.  Negative for shortness of breath.    Cardiovascular: Negative.  Negative for chest pain and palpitations.    Gastrointestinal: Negative.    Endocrine: Negative.    Genitourinary: Negative.    Musculoskeletal: Negative.  Negative for neck pain.   Skin: Negative.    Allergic/Immunologic: Negative.    Neurological:  Positive for headaches.   Hematological: Negative.    Psychiatric/Behavioral: Negative.         Medical / Social / Family History     No past medical history on file.     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)   Tobacco Use    Smoking status: Never    Smokeless tobacco: Never   Substance and Sexual Activity    Alcohol use: Never    Drug use: Never    Sexual activity: Yes     Partners: Male     Birth control/protection: See Surgical Hx, None     Comment:      Social Determinants of Health     Financial Resource Strain: Low Risk  (1/15/2024)    Overall Financial Resource Strain (CARDIA)     Difficulty of Paying Living Expenses: Not hard at all   Food Insecurity: No Food Insecurity (1/15/2024)    Hunger Vital Sign     Worried About Running Out of Food in the Last Year: Never true     Ran Out of Food in the Last Year: Never true   Transportation Needs: No Transportation Needs (1/15/2024)    PRAPARE - Transportation     Lack of Transportation (Medical): No     Lack of Transportation (Non-Medical): No   Physical Activity: Inactive (2024)    Exercise Vital Sign     Days of Exercise per Week: 0 days     Minutes of Exercise per Session: 0 min   Stress: No Stress Concern Present (1/15/2024)    [place] [employer] - Occupational Stress Questionnaire     Feeling of Stress : Not at all   Social Connections: Unknown (1/15/2024)    Social  Connection and Isolation Panel [NHANES]     Frequency of Communication with Friends and Family: More than three times a week     Frequency of Social Gatherings with Friends and Family: Once a week     Active Member of Clubs or Organizations: No     Attends Club or Organization Meetings: Never     Marital Status:    Housing Stability: Low Risk  (1/15/2024)    Housing Stability Vital Sign     Unable to Pay for Housing in the Last Year: No     Number of Places Lived in the Last Year: 1     Unstable Housing in the Last Year: No        Family History   Problem Relation Age of Onset    Hypertension Mother     Heart failure Mother     Asthma Father         Prince Humphrey    COPD Father     Arthritis Father     Breast cancer Maternal Grandmother 61    Cancer Maternal Grandmother     Melanoma Maternal Aunt 61    Stroke Maternal Aunt     Breast cancer Paternal Aunt 46    Pancreatic cancer Paternal Uncle 60    Cancer Paternal Uncle     Diabetes Paternal Grandmother     Asthma Son     Asthma Paternal Uncle     Learning disabilities Brother     Cancer Paternal Aunt     Cancer Maternal Aunt     Cancer Maternal Cousin     Stroke Maternal Aunt     Stroke Maternal Uncle     Stroke Maternal Aunt     Stroke Maternal Uncle         Medications and Allergies     Medications  Current Outpatient Medications   Medication Instructions    amLODIPine (NORVASC) 5 mg, Oral    hydroCHLOROthiazide (HYDRODIURIL) 25 mg, Oral, Daily    ketoconazole (NIZORAL) 2 % cream ketoconazole 2 % topical cream   APPLY THIN LAYER TO FACE AND NECK TWICE DAILY FOR 1 TO 2 WEEKS    ketoconazole (NIZORAL) 2 % shampoo ketoconazole 2 % shampoo   SHAMPOO SCALP AND LEAVE IN FOR 3 TO 5 MINUTES THEN RINSE OUT EVERY OTHER DAY FOR 2 WEEKS THEN AS NEEDED    triamcinolone acetonide 0.1% (KENALOG) 0.1 % cream        Allergies  Review of patient's allergies indicates:  No Known Allergies    Physical Examination     Visit Vitals  /79 (BP Location: Left arm, Patient  "Position: Sitting, BP Method: Large (Automatic))   Pulse 78   Temp 97.6 °F (36.4 °C) (Oral)   Resp 20   Ht 5' 7" (1.702 m)   Wt 60.2 kg (132 lb 12.8 oz)   LMP 02/09/2024   BMI 20.80 kg/m²       Physical Exam  Constitutional:       General: She is not in acute distress.     Appearance: Normal appearance. She is not ill-appearing or diaphoretic.   HENT:      Head: Normocephalic.   Cardiovascular:      Rate and Rhythm: Normal rate and regular rhythm.      Heart sounds: Normal heart sounds. No murmur heard.  Pulmonary:      Effort: Pulmonary effort is normal. No respiratory distress.      Breath sounds: Normal breath sounds. No stridor. No wheezing, rhonchi or rales.   Skin:     General: Skin is warm and dry.   Neurological:      Mental Status: She is alert and oriented to person, place, and time. Mental status is at baseline.      Coordination: Coordination normal.      Gait: Gait normal.   Psychiatric:         Mood and Affect: Mood normal.         Behavior: Behavior normal.         Thought Content: Thought content normal.         Judgment: Judgment normal.           Results     Assessment       ICD-10-CM ICD-9-CM   1. Primary hypertension  I10 401.9   2. Anemia due to other cause, not classified  D64.89 285.8       Plan       Problem List Items Addressed This Visit          Cardiac/Vascular    Hypertension - Primary  BP Readings from Last 3 Encounters:   03/25/24 116/79   02/29/24 138/88   02/29/24 126/85     Follow low sodium diet, < 2 gm/day (avoid high salty foods such as processed meats/ sausage/farooq/ sandwich meat, chips, pickles, cheese, crackers and soft drinks/ electrolyte replacement drinks).  Avoid tobacco/ alcohol use  Educated on health benefits of at least 5 days/ week of 30 minutes moderate intensity exercise (brisk walking) and 2 or more days/ week of muscle strength activities  Daily ASA 81 mg for CV prevention  Continue current medication regimen. Do not suggest patient obtain script for amlodipine 5 " mg provided [place] [facility] [place] to hypotension after dose administered in ER. Patient admits to taking HCTZ intermittently for BP readings > 140/80. She does have home monitor and encouraged her to check daily. Notify provider with any elevated readings and/or associated symptoms.       Relevant Orders    Basic Metabolic Panel       Oncology    Other specified anemias    Relevant Orders    CBC Auto Differential       Future Appointments   Date Time Provider Department Center   4/3/2024  1:45 PM Stone Upton MD [facility]   4/29/2024 11:00 AM [facility]    5/29/2024 11:00 AM Мария Stapleton NP [facility] [place]   9/3/2024  9:45 AM Dory Ann PA-C [facility] [place] [facility]        Follow up if symptoms worsen or fail to improve.    Signature:  Мария Stapleton NP  [facility]  [address] [facility] [address]    Date of encounter: 3/25/24

## 2024-04-03 PROCEDURE — 87491 CHLMYD TRACH DNA AMP PROBE: CPT | Performed by: OBSTETRICS & GYNECOLOGY

## 2024-04-03 PROCEDURE — 87661 TRICHOMONAS VAGINALIS AMPLIF: CPT | Performed by: OBSTETRICS & GYNECOLOGY

## 2024-04-03 PROCEDURE — 87591 N.GONORRHOEAE DNA AMP PROB: CPT | Performed by: OBSTETRICS & GYNECOLOGY

## 2024-04-03 PROCEDURE — 87624 HPV HI-RISK TYP POOLED RSLT: CPT | Performed by: OBSTETRICS & GYNECOLOGY

## 2024-04-25 NOTE — PROGRESS NOTES
[facility] Breast Surg  Breast Surgical Oncology  Follow-Up Patient Office Visit       Referring Provider: No ref. provider found   PCP: Мария Stapleton, NP   Care Team:   OBGYN: Dr. Stone Upton    Chief Complaint:   Chief Complaint   Patient presents with    Follow-up     Patient reports no breast related concerns         Subjective:     Interval History:  2024 - Rebecca Humphrey is here today for annual follow-up for high-risk of breast cancer.  She is doing well today. She currently denies any breast issues including rashes, redness,swelling, nipple discharge, or new lumps/masses.  Patient underwent breast MRI in 2023 which required further imaging.  She underwent bilateral diagnostic mammogram and ultrasound in 2023 which revealed some benign cysts of varying size in the upper outer quadrant of her right breast but was benign.  Patient was recommended to do a follow up bilateral breast MRI in 2024, however this was never scheduled.  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 Otherwise, patient is doing well today and she has no other complaints or concerns.    HPI:  Rebecca Humphrey is a pleasant Female patient who initially presents on  22 at 31 Years old for evaluation and assessment of risk for breast cancer based on the Tyrer-Cuzick Breast Cancer Risk Model (>20% indicates elevated lifetime risk). Her lifetime risk is calculated to be: 26.1% (re-calculated 3/16/2023)     [place] [facility] [place] - BENIGN. No mammographic evidence of malignancy in either breast. No suspicious mammographic or sonographic findings in the area of the patient's pain in the left breast 2:00, 4 cm from the nipple.  [place] [facility] [place] MRI evidence of malignancy in either breast.  2/3/2023 SCR MG  at [facility] - There is no mammographic evidence of malignancy.   10/26/2023 Breast MRI - Oval, enhancing masses with circumscribed margins in the upper-outer quadrant of the right breast, posterior depth, need further evaluation.  Recommend an MR directed right breast targeted ultrasound and possible right diagnostic mammogram if needed.  If no sonographic or mammographic correlate is seen, these masses will be considered probably benign with a follow-up bilateral breast MRI with contrast recommended in 6 months. No MR evidence of malignancy in the left breast. BI-RADS: 0 Incomplete: Need Additional Imaging Evaluation   12/07/2023 BL DG MG and US - No mammographic evidence of malignancy is seen involving either breast.  No sonographic evidence of malignancy is seen involving the upper-outer quadrant of the right breast. Benign cysts of varying size and complication in the upper-outer quadrant of the right breast are benign. BIRADS: 2 Benign A follow-up bilateral breast MRI with contrast in April 2024 is recommended to document stability.       OB/GYN History:  Menarche Onset: 10  Menopause: Premenopausal, Patient's last menstrual period was 04/20/2024.  Hormonal birth control (duration): 3 years  Pregnancies: 3  Age at first child birth: 24  Child births: 4  Breastfeeding duration:  18months total  Hysterectomy: no  Oophorectomy: no  HRT: no    Other:  # of breast biopsies (when and pathology results): none  MG breast density: BIRADS D, extremely dense  Prior thoracic RT: none  Genetic testing: Negative aside from two (2) variants of uncertain significance (VUS): NF1 p.A887V and TSC2 p.P685L.   Ashkenazi Druze descent: No    Family History:  Family History   Problem Relation Name Age of Onset    Hypertension Mother Yue Humphrey     Heart failure Mother Yue Humphrey     Asthma Father Prince Humphrey    COPD Father Prince Humphrey     Arthritis Father Prince Humphrey     Breast cancer  Maternal Grandmother Leida Puente Bautista 61    Cancer Maternal Grandmother Leida Bautista     Melanoma Maternal Aunt Paradise Louann 61    Stroke Maternal Aunt Paradise Louann     Breast cancer Paternal Aunt  46    Pancreatic cancer Paternal Uncle Ulices Humphrey 60    Cancer Paternal Uncle Ulices Humphrey     Diabetes Paternal Grandmother Dina Humphrey     Asthma Son Daniel Humphrey     Asthma Paternal Uncle Keyana Humphrey     Learning disabilities Brother Carmine Humphrey     Cancer Paternal Aunt Sheila Merlin     Breast cancer Paternal Aunt Sheila Merlin     Cancer Maternal Aunt Jerilyn Bautista     Cancer Maternal Cousin Rebekah Morales     Stroke Maternal Aunt Barbara Ayoub     Stroke Maternal Uncle Altaf Sandhublanc     Stroke Maternal Aunt Sheila Bautista     Stroke Maternal Uncle Mark Bautista       Patient History:  Past Medical History:   Diagnosis Date    Hypertension 18       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)   Tobacco Use    Smoking status: Never    Smokeless tobacco: Never   Substance and Sexual Activity    Alcohol use: Never    Drug use: Never    Sexual activity: Yes     Partners: Male     Birth control/protection: See Surgical Hx, None     Comment:      Social Determinants of Health     Financial Resource Strain: Low Risk  (1/15/2024)    Overall Financial Resource Strain (CARDIA)     Difficulty of Paying Living Expenses: Not hard at all   Food Insecurity: No Food Insecurity (1/15/2024)    Hunger Vital Sign     Worried About Running Out of Food in the Last Year: Never true     Ran Out of Food in the Last Year: Never true    Transportation Needs: No Transportation Needs (1/15/2024)    PRAPARE - Transportation     Lack of Transportation (Medical): No     Lack of Transportation (Non-Medical): No   Physical Activity: Inactive (2/29/2024)    Exercise Vital Sign     Days of Exercise per Week: 0 days     Minutes of Exercise per Session: 0 min   Stress: No Stress Concern Present (1/15/2024)    [facility] - Occupational Stress Questionnaire     Feeling of Stress : Not at all   Social Connections: Unknown (1/15/2024)    Social Connection and Isolation Panel [NHANES]     Frequency of Communication with Friends and Family: More than three times a week     Frequency of Social Gatherings with Friends and Family: Once a week     Active Member of Clubs or Organizations: No     Attends Club or Organization Meetings: Never     Marital Status:    Housing Stability: Low Risk  (1/15/2024)    Housing Stability Vital Sign     Unable to Pay for Housing in the Last Year: No     Number of Places Lived in the Last Year: 1     Unstable Housing in the Last Year: No       Immunization History   Administered Date(s) Administered    COVID-19, MRNA, LN-S, PF (Pfizer) (Purple Cap) 03/05/2021, 03/26/2021, 10/24/2021    COVID-19, mRNA, LNP-S, PF, hong-sucrose, 30 mcg/0.3 mL (Pfizer 2023 Ages 12+) 12/22/2023    DTP 1988, 01/24/1989, 03/21/1989, 08/17/1992    HIB 08/17/1992    Hepatitis A, Adult 10/16/2007    Hepatitis A, Pediatric/Adolescent, 2 Dose 07/06/2006    Hepatitis B, Pediatric/Adolescent 02/27/2004, 07/02/2004, 04/18/2005    Influenza - Quadrivalent - PF *Preferred* (6 months and older) 10/24/2021, 01/18/2024    MMR 08/17/1992, 10/20/1992    Meningococcal Conjugate (MCV4P) 07/06/2006    OPV 1988, 01/24/1989, 08/17/1992    Td (ADULT) 05/15/2000, 02/27/2004    Tdap 03/28/2012, 09/06/2014, 02/02/2018, 05/03/2020, 03/02/2021       Medications/Allergies:  Current Outpatient Medications on File Prior to Visit    Medication Sig Dispense Refill    amLODIPine (NORVASC) 5 MG tablet Take 5 mg by mouth.      hydroCHLOROthiazide (HYDRODIURIL) 25 MG tablet Take 1 tablet (25 mg total) by mouth once daily. 30 tablet 5    ketoconazole (NIZORAL) 2 % cream ketoconazole 2 % topical cream   APPLY THIN LAYER TO FACE AND NECK TWICE DAILY FOR 1 TO 2 WEEKS      ketoconazole (NIZORAL) 2 % shampoo ketoconazole 2 % shampoo   SHAMPOO SCALP AND LEAVE IN FOR 3 TO 5 MINUTES THEN RINSE OUT EVERY OTHER DAY FOR 2 WEEKS THEN AS NEEDED      triamcinolone acetonide 0.1% (KENALOG) 0.1 % cream        No current facility-administered medications on file prior to visit.       Review of patient's allergies indicates:  No Known Allergies    Review of Systems:  Pertinent items are noted in HPI.     Objective:     Vitals:  Vitals:    04/29/24 1118   BP: 127/81   Pulse: 77   Resp: 18   Temp: 98 °F (36.7 °C)       Body mass index is 19.73 kg/m².     Physical Exam:  General: The patient is awake, alert and oriented times three. The patient is well nourished and in no acute distress.  Neck: There is no evidence of palpable cervical, supraclavicular or axillary adenopathy. The neck is supple. The thyroid is not enlarged.  Musculoskeletal: The patient has a normal range of motion of her bilateral upper extremities.  Chest: Examination of the chest wall fails to reveal any obvious abnormalities. Nonlabored breathing, symmetric expansion.  Breast:  Right:  Examination of right breast fails to reveal any dominant masses or areas of significant focal nodularity. The nipple is everted without evidence of discharge. There is no skin dimpling with movement of the pectoralis. There are no significant skin changes overlying the breast.   Left:  Examination of the left breast fails to reveal any dominant masses or areas of significant focal nodularity. The nipple is everted without evidence of discharge. There is no skin dimpling with movement of the pectoralis. There are no  significant skin changes overlying the breast.  Abdomen: The abdomen is soft, flat, nontender and nondistended.  Integumentary: no rashes or skin lesions present  Neurologic: cranial nerves intact, no signs of peripheral neurological deficit, motor/sensory function intact    Assessment and Plan:     Rebecca was seen today for follow-up.    Diagnoses and all orders for this visit:    At high risk for breast cancer  -     Mammo Digital Screening Bilat w/ Zach; Future  -     MRI Breast w/wo Contrast, w/CAD, Bilateral; Future    Family history of breast cancer  -     Mammo Digital Screening Bilat w/ Zach; Future  -     MRI Breast w/wo Contrast, w/CAD, Bilateral; Future    Screening mammogram for breast cancer  -     Mammo Digital Screening Bilat w/ Zach; Future         Plan:     PLAN:    1. Lifestyle - Healthy lifestyle guidelines were reviewed. She was encouraged to engage in regular exercise, maintain a healthy body weight, and avoid excessive alcohol consumption. Healthy nutritional guidelines were also discussed. Self-breast examination was reviewed with the patient in detail and she was encouraged to perform this on a monthly basis.    2. Surveillance - She desires continuing high risk screening with annual screening mammograms and breast MRIs. In the absence of significant clinical findings in the interval, I recommend follow-up breast MRI next available and screening mammogram in December 2024.  RTC in 6 months.    3. Prevention - We had a brief discussion/education about indications for preventative mastectomy or chemoprevention.  These methods are not recommended to her at this time.    4. Genetics - Negative aside from two (2) variants of uncertain significance (VUS): NF1 p.A887V and TSC2 p.P685L     5. Other -  Continue to see OB/GYN for CBE. Recommend two CBE a year. One with us and one with your OB/GYN Provider. We recommend them to be at a six month interval.  Patient just saw her gynecologist earlier  this month, so I will see patient back in about 6 months in order to establish CBE q.6 months schedule.  After next appointment, patient can return to annual visits.    All of her questions were answered. She was advised to call if she develops any questions or concerns.    Ayana Francisco PA-C       Total time on the date of the visit ranged from 30-39 mins (40495). Total time includes both face-to-face and non-face-to-face time personally spent by myself on the day of the visit.    Non-face-to-face time included:  _X_ preparing to see the patient such as reviewing the patient record  __ obtaining and reviewing separately obtained history  _X_ independently interpreting results  _X_ documenting clinical information in electronic health record.    Face-to-face time included:  _X_ performing an appropriate history and examination  _X_ communicating results to the patient  _X_ counseling and educating the patient  __ ordering appropriate medications  _x_ ordering appropriate tests  _X_ ordering appropriate procedures (including follow-up)  _X_ answering any questions the patient had    Total Time spent on date of visit: 35 minutes

## 2024-04-29 ENCOUNTER — OFFICE VISIT (OUTPATIENT)
Dept: SURGERY | Facility: CLINIC | Age: 34
End: 2024-04-29
Payer: MEDICAID

## 2024-04-29 VITALS
HEART RATE: 77 BPM | TEMPERATURE: 98 F | RESPIRATION RATE: 18 BRPM | WEIGHT: 126 LBS | DIASTOLIC BLOOD PRESSURE: 81 MMHG | BODY MASS INDEX: 19.78 KG/M2 | OXYGEN SATURATION: 99 % | HEIGHT: 67 IN | SYSTOLIC BLOOD PRESSURE: 127 MMHG

## 2024-04-29 DIAGNOSIS — Z91.89 AT HIGH RISK FOR BREAST CANCER: Primary | ICD-10-CM

## 2024-04-29 DIAGNOSIS — Z12.31 SCREENING MAMMOGRAM FOR BREAST CANCER: ICD-10-CM

## 2024-04-29 DIAGNOSIS — Z80.3 FAMILY HISTORY OF BREAST CANCER: ICD-10-CM

## 2024-04-29 PROCEDURE — 1159F MED LIST DOCD IN RCRD: CPT | Mod: CPTII,,,

## 2024-04-29 PROCEDURE — 3066F NEPHROPATHY DOC TX: CPT | Mod: CPTII,,,

## 2024-04-29 PROCEDURE — 3074F SYST BP LT 130 MM HG: CPT | Mod: CPTII,,,

## 2024-04-29 PROCEDURE — 3044F HG A1C LEVEL LT 7.0%: CPT | Mod: CPTII,,,

## 2024-04-29 PROCEDURE — 3079F DIAST BP 80-89 MM HG: CPT | Mod: CPTII,,,

## 2024-04-29 PROCEDURE — 99214 OFFICE O/P EST MOD 30 MIN: CPT | Mod: S$PBB,,,

## 2024-04-29 PROCEDURE — 3060F POS MICROALBUMINURIA REV: CPT | Mod: CPTII,,,

## 2024-04-29 PROCEDURE — 1160F RVW MEDS BY RX/DR IN RCRD: CPT | Mod: CPTII,,,

## 2024-04-29 PROCEDURE — 3008F BODY MASS INDEX DOCD: CPT | Mod: CPTII,,,

## 2024-04-29 PROCEDURE — 99214 OFFICE O/P EST MOD 30 MIN: CPT | Mod: PBBFAC

## 2024-04-29 PROCEDURE — 99999 PR PBB SHADOW E&M-EST. PATIENT-LVL IV: CPT | Mod: PBBFAC,,,

## 2024-05-23 ENCOUNTER — LAB VISIT (OUTPATIENT)
Dept: LAB | Facility: HOSPITAL | Age: 34
End: 2024-05-23
Attending: NURSE PRACTITIONER
Payer: MEDICAID

## 2024-05-23 DIAGNOSIS — D64.89 ANEMIA DUE TO OTHER CAUSE, NOT CLASSIFIED: ICD-10-CM

## 2024-05-23 DIAGNOSIS — I10 PRIMARY HYPERTENSION: ICD-10-CM

## 2024-05-23 LAB
ANION GAP SERPL CALC-SCNC: 6 MEQ/L
BASOPHILS # BLD AUTO: 0.03 X10(3)/MCL
BASOPHILS NFR BLD AUTO: 0.9 %
BUN SERPL-MCNC: 6.9 MG/DL (ref 7–18.7)
CALCIUM SERPL-MCNC: 9.4 MG/DL (ref 8.4–10.2)
CHLORIDE SERPL-SCNC: 106 MMOL/L (ref 98–107)
CO2 SERPL-SCNC: 27 MMOL/L (ref 22–29)
CREAT SERPL-MCNC: 0.79 MG/DL (ref 0.55–1.02)
CREAT/UREA NIT SERPL: 9
EOSINOPHIL # BLD AUTO: 0.02 X10(3)/MCL (ref 0–0.9)
EOSINOPHIL NFR BLD AUTO: 0.6 %
ERYTHROCYTE [DISTWIDTH] IN BLOOD BY AUTOMATED COUNT: 12.8 % (ref 11.5–17)
GFR SERPLBLD CREATININE-BSD FMLA CKD-EPI: >60 ML/MIN/1.73/M2
GLUCOSE SERPL-MCNC: 109 MG/DL (ref 74–100)
HCT VFR BLD AUTO: 33.2 % (ref 37–47)
HGB BLD-MCNC: 10.7 G/DL (ref 12–16)
IMM GRANULOCYTES # BLD AUTO: 0 X10(3)/MCL (ref 0–0.04)
IMM GRANULOCYTES NFR BLD AUTO: 0 %
LYMPHOCYTES # BLD AUTO: 1.25 X10(3)/MCL (ref 0.6–4.6)
LYMPHOCYTES NFR BLD AUTO: 39.2 %
MCH RBC QN AUTO: 29.3 PG (ref 27–31)
MCHC RBC AUTO-ENTMCNC: 32.2 G/DL (ref 33–36)
MCV RBC AUTO: 91 FL (ref 80–94)
MONOCYTES # BLD AUTO: 0.31 X10(3)/MCL (ref 0.1–1.3)
MONOCYTES NFR BLD AUTO: 9.7 %
NEUTROPHILS # BLD AUTO: 1.58 X10(3)/MCL (ref 2.1–9.2)
NEUTROPHILS NFR BLD AUTO: 49.6 %
NRBC BLD AUTO-RTO: 0 %
PLATELET # BLD AUTO: 124 X10(3)/MCL (ref 130–400)
PLATELETS.RETICULATED NFR BLD AUTO: 19.1 % (ref 0.9–11.2)
PMV BLD AUTO: 13.6 FL (ref 7.4–10.4)
POTASSIUM SERPL-SCNC: 3.4 MMOL/L (ref 3.5–5.1)
RBC # BLD AUTO: 3.65 X10(6)/MCL (ref 4.2–5.4)
SODIUM SERPL-SCNC: 139 MMOL/L (ref 136–145)
WBC # SPEC AUTO: 3.19 X10(3)/MCL (ref 4.5–11.5)

## 2024-05-23 PROCEDURE — 80048 BASIC METABOLIC PNL TOTAL CA: CPT

## 2024-05-23 PROCEDURE — 85025 COMPLETE CBC W/AUTO DIFF WBC: CPT

## 2024-05-23 PROCEDURE — 36415 COLL VENOUS BLD VENIPUNCTURE: CPT

## 2024-07-10 ENCOUNTER — LAB VISIT (OUTPATIENT)
Dept: LAB | Facility: HOSPITAL | Age: 34
End: 2024-07-10
Attending: NURSE PRACTITIONER
Payer: MEDICAID

## 2024-07-10 ENCOUNTER — OFFICE VISIT (OUTPATIENT)
Dept: INTERNAL MEDICINE | Facility: CLINIC | Age: 34
End: 2024-07-10
Payer: MEDICAID

## 2024-07-10 VITALS
SYSTOLIC BLOOD PRESSURE: 139 MMHG | WEIGHT: 127.19 LBS | OXYGEN SATURATION: 99 % | RESPIRATION RATE: 20 BRPM | HEART RATE: 77 BPM | BODY MASS INDEX: 20.44 KG/M2 | HEIGHT: 66 IN | DIASTOLIC BLOOD PRESSURE: 79 MMHG | TEMPERATURE: 98 F

## 2024-07-10 DIAGNOSIS — I10 PRIMARY HYPERTENSION: ICD-10-CM

## 2024-07-10 DIAGNOSIS — D64.89 ANEMIA DUE TO OTHER CAUSE, NOT CLASSIFIED: ICD-10-CM

## 2024-07-10 DIAGNOSIS — R39.89 SENSATION OF PRESSURE IN BLADDER AREA: Primary | ICD-10-CM

## 2024-07-10 DIAGNOSIS — R63.4 UNINTENTIONAL WEIGHT LOSS: ICD-10-CM

## 2024-07-10 DIAGNOSIS — R39.14 FEELING OF INCOMPLETE BLADDER EMPTYING: ICD-10-CM

## 2024-07-10 LAB
BASOPHILS # BLD AUTO: 0.02 X10(3)/MCL
BASOPHILS NFR BLD AUTO: 0.5 %
EOSINOPHIL # BLD AUTO: 0.03 X10(3)/MCL (ref 0–0.9)
EOSINOPHIL NFR BLD AUTO: 0.7 %
ERYTHROCYTE [DISTWIDTH] IN BLOOD BY AUTOMATED COUNT: 13.2 % (ref 11.5–17)
HCT VFR BLD AUTO: 35.8 % (ref 37–47)
HGB BLD-MCNC: 11.5 G/DL (ref 12–16)
IMM GRANULOCYTES # BLD AUTO: 0.01 X10(3)/MCL (ref 0–0.04)
IMM GRANULOCYTES NFR BLD AUTO: 0.2 %
LYMPHOCYTES # BLD AUTO: 1.46 X10(3)/MCL (ref 0.6–4.6)
LYMPHOCYTES NFR BLD AUTO: 34.8 %
MCH RBC QN AUTO: 29.3 PG (ref 27–31)
MCHC RBC AUTO-ENTMCNC: 32.1 G/DL (ref 33–36)
MCV RBC AUTO: 91.3 FL (ref 80–94)
MONOCYTES # BLD AUTO: 0.35 X10(3)/MCL (ref 0.1–1.3)
MONOCYTES NFR BLD AUTO: 8.3 %
NEUTROPHILS # BLD AUTO: 2.33 X10(3)/MCL (ref 2.1–9.2)
NEUTROPHILS NFR BLD AUTO: 55.5 %
NRBC BLD AUTO-RTO: 0 %
PLATELET # BLD AUTO: 178 X10(3)/MCL (ref 130–400)
PMV BLD AUTO: 13 FL (ref 7.4–10.4)
RBC # BLD AUTO: 3.92 X10(6)/MCL (ref 4.2–5.4)
WBC # BLD AUTO: 4.2 X10(3)/MCL (ref 4.5–11.5)

## 2024-07-10 PROCEDURE — 1159F MED LIST DOCD IN RCRD: CPT | Mod: CPTII,,, | Performed by: NURSE PRACTITIONER

## 2024-07-10 PROCEDURE — 99214 OFFICE O/P EST MOD 30 MIN: CPT | Mod: S$PBB,,, | Performed by: NURSE PRACTITIONER

## 2024-07-10 PROCEDURE — 99214 OFFICE O/P EST MOD 30 MIN: CPT | Mod: PBBFAC | Performed by: NURSE PRACTITIONER

## 2024-07-10 PROCEDURE — 3044F HG A1C LEVEL LT 7.0%: CPT | Mod: CPTII,,, | Performed by: NURSE PRACTITIONER

## 2024-07-10 PROCEDURE — 3060F POS MICROALBUMINURIA REV: CPT | Mod: CPTII,,, | Performed by: NURSE PRACTITIONER

## 2024-07-10 PROCEDURE — 3078F DIAST BP <80 MM HG: CPT | Mod: CPTII,,, | Performed by: NURSE PRACTITIONER

## 2024-07-10 PROCEDURE — 36415 COLL VENOUS BLD VENIPUNCTURE: CPT

## 2024-07-10 PROCEDURE — 3066F NEPHROPATHY DOC TX: CPT | Mod: CPTII,,, | Performed by: NURSE PRACTITIONER

## 2024-07-10 PROCEDURE — 85025 COMPLETE CBC W/AUTO DIFF WBC: CPT

## 2024-07-10 PROCEDURE — 1160F RVW MEDS BY RX/DR IN RCRD: CPT | Mod: CPTII,,, | Performed by: NURSE PRACTITIONER

## 2024-07-10 PROCEDURE — 3008F BODY MASS INDEX DOCD: CPT | Mod: CPTII,,, | Performed by: NURSE PRACTITIONER

## 2024-07-10 PROCEDURE — 3075F SYST BP GE 130 - 139MM HG: CPT | Mod: CPTII,,, | Performed by: NURSE PRACTITIONER

## 2024-07-10 RX ORDER — HYDROCHLOROTHIAZIDE 25 MG/1
25 TABLET ORAL DAILY
Qty: 30 TABLET | Refills: 5 | Status: SHIPPED | OUTPATIENT
Start: 2024-07-10

## 2024-07-10 NOTE — ASSESSMENT & PLAN NOTE
Patient does endorse feelings of coldness.  Does have regular menstrual cycles.  Recheck CBC with smear today

## 2024-07-10 NOTE — ASSESSMENT & PLAN NOTE
BP Readings from Last 3 Encounters:   07/10/24 139/79   04/29/24 127/81   04/03/24 114/72     Follow low sodium diet, < 2 gm/day (avoid high salty foods such as processed meats/ sausage/farooq/ sandwich meat, chips, pickles, cheese, crackers and soft drinks/ electrolyte replacement drinks).  Avoid tobacco/ alcohol use  Educated on health benefits of at least 5 days/ week of 30 minutes moderate intensity exercise (brisk walking) and 2 or more days/ week of muscle strength activities  Daily ASA 81 mg for CV prevention  Continue current medication regimen

## 2024-07-10 NOTE — PROGRESS NOTES
Мария L Pieter, NP   [facility]  [address] [place] [address]      PATIENT NAME: Rebecca Humphrey  : 1990  DATE: 7/10/24  MRN: [id number]        History of Present Illness / Problem Focused Workflow     Rebecca Humphrey presents to the clinic with Follow-up (C/O ), Abdominal Pain (Right side), and Questions about emptying bladder     HPI: Initial visit 24: 34 yo AAF to establish PCP care. Pmh includes HTN, history of A fib. 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 She is . Has 4 children, 7 yo, 4 yo, 3 yo twins. Wellness screenings UTD. She wants flu vaccine today. Tolerated before without s/e.      She is c/o headaches and fatigue over the last few weeks. She denies any change in activities/ home environment/ stress/ sleep. She does mention h/o A fib with her last pregnancy. Previously was seen by Dr. Christianson in  ? She states she was on metoprolol at one point but did not have to continue and has not had f/u with Cardiologist in a few years. Does notice HR elevated on her apple watch in triple digits. Denies identifying any associated SOB/ CP/ dizziness with episodes. Denies any fever, chills, dizziness, sore throat, sinus congestion, LAD, cough, SOB, CP, abdominal pain, n/v/d, hematochezia, hematuria, irregular menstrual cycles.      24: Pt for follow up after establishing PCP care and to discuss results. Labs reviewed. Anemia noted. Does endorse irregular menstrual cycles. Has Cardiology appointment this morning. Echo denied per insurance. Need copy of outside report/ previous completed approx 4 years ago with [facility]. Denies any changes in her condition.     7/10/24: Pt for follow up. C/o urinary pressure and feeling of incomplete emptying. She reports having lower abdominal pain on  after eating seafood the night before. She states last time she ate seafood she ended up  with UTI and thinks she may have another one now. She does admit to having a few vomiting episodes and diarrhea on  but this has resolved since. Abdominal pain resolved also. Feeling well otherwise. Mentions losing weight and not trying to do so. She denies any fever, chills, night sweats, HA, dizziness, CP, SOB, fatigue. Denies changes with medications/ food intake or activity level.     Other providers:   Dr. Upton, GYN    Review of Systems     Review of Systems   Constitutional:  Positive for unexpected weight change.   HENT: Negative.     Eyes: Negative.    Respiratory: Negative.     Cardiovascular: Negative.    Gastrointestinal: Negative.    Endocrine: Negative.    Genitourinary:  Positive for difficulty urinating and pelvic pain.   Musculoskeletal: Negative.    Skin: Negative.    Allergic/Immunologic: Negative.    Neurological: Negative.    Hematological: Negative.    Psychiatric/Behavioral: Negative.         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)   Tobacco Use    Smoking status: Never    Smokeless tobacco: Never   Substance and Sexual Activity    Alcohol use: Never    Drug use: Never    Sexual activity: Yes     Partners: Male     Birth control/protection: See Surgical Hx, None     Comment:      Social Determinants of Health     Financial Resource Strain: Low Risk  (1/15/2024)    Overall Financial Resource Strain (CARDIA)     Difficulty of Paying Living Expenses: Not hard at all   Food Insecurity: No Food Insecurity (1/15/2024)    Hunger Vital Sign      Worried About Running Out of Food in the Last Year: Never true     Ran Out of Food in the Last Year: Never true   Transportation Needs: No Transportation Needs (1/15/2024)    PRAPARE - Transportation     Lack of Transportation (Medical): No     Lack of Transportation (Non-Medical): No   Physical Activity: Inactive (2/29/2024)    Exercise Vital Sign     Days of Exercise per Week: 0 days     Minutes of Exercise per Session: 0 min   Stress: No Stress Concern Present (1/15/2024)    [employer] - Occupational Stress Questionnaire     Feeling of Stress : Not at all   Housing Stability: Low Risk  (1/15/2024)    Housing Stability Vital Sign     Unable to Pay for Housing in the Last Year: No     Number of Places Lived in the Last Year: 1     Unstable Housing in the Last Year: No        Family History   Problem Relation Name Age of Onset    Hypertension Mother Yue Humphrey     Heart failure Mother Yue Humphrey     Asthma Father Prince Humphrey    COPD Father Prince Humphrey     Arthritis Father Prince Humphrey     Breast cancer Maternal Grandmother Leida Puente Bautista 61    Cancer Maternal Grandmother Leida Bautista     Melanoma Maternal Aunt Paradise Louann 61    Stroke Maternal Aunt [place] Louann     Breast cancer Paternal Aunt  46    Pancreatic cancer Paternal Uncle Ulices Humphrey 60    Cancer Paternal Uncle Ulices Humphrey     Diabetes Paternal Grandmother Dina Humphrey     Asthma Son Daniel Humphrey     Asthma Paternal Uncle Keyana Humphrey     Learning disabilities Brother Carmine Humphrey     Cancer Paternal Aunt Sheila Merlin     Breast cancer Paternal Aunt Sheila Merlin     Cancer Maternal Aunt Jerilyn Bautista     Cancer Maternal Cousin Rebekah Andrew     Stroke Maternal Aunt Percatalina Ayoub     Stroke Maternal Uncle Altaf Bautista     Stroke Maternal Aunt Sheila Bautista     Stroke Maternal Uncle Mark Bautista         Medications and Allergies  "    Medications  Current Outpatient Medications   Medication Instructions    hydroCHLOROthiazide (HYDRODIURIL) 25 mg, Oral, Daily       Allergies  Review of patient's allergies indicates:  No Known Allergies    Physical Examination     Visit Vitals  /79 (BP Location: Left arm, Patient Position: Sitting, BP Method: Large (Automatic))   Pulse 77   Temp 98.4 °F (36.9 °C) (Oral)   Resp 20   Ht 5' 6" (1.676 m)   Wt 57.7 kg (127 lb 3.2 oz)   SpO2 99%   BMI 20.53 kg/m²       Physical Exam  Constitutional:       General: She is not in acute distress.     Appearance: Normal appearance. She is not ill-appearing or diaphoretic.   HENT:      Head: Normocephalic.   Cardiovascular:      Rate and Rhythm: Normal rate and regular rhythm.      Heart sounds: Normal heart sounds. No murmur heard.  Pulmonary:      Effort: Pulmonary effort is normal. No respiratory distress.      Breath sounds: Normal breath sounds. No stridor. No wheezing, rhonchi or rales.   Abdominal:      Tenderness: There is no abdominal tenderness. There is no right CVA tenderness or left CVA tenderness.   Skin:     General: Skin is warm and dry.   Neurological:      Mental Status: She is alert and oriented to person, place, and time. Mental status is at baseline.      Coordination: Coordination normal.      Gait: Gait normal.   Psychiatric:         Mood and Affect: Mood normal.         Behavior: Behavior normal.         Thought Content: Thought content normal.         Judgment: Judgment normal.           Results     Lab Results   Component Value Date    WBC 3.19 (L) 05/23/2024    RBC 3.65 (L) 05/23/2024    HGB 10.7 (L) 05/23/2024    HCT 33.2 (L) 05/23/2024    MCV 91.0 05/23/2024    MCH 29.3 05/23/2024    MCHC 32.2 (L) 05/23/2024    RDW 12.8 05/23/2024     (L) 05/23/2024    MPV 13.6 (H) 05/23/2024            Assessment       ICD-10-CM ICD-9-CM   1. Sensation of pressure in bladder area  R39.89 596.89   2. Feeling of incomplete bladder emptying  R39.14 " 788.21   3. Primary hypertension  I10 401.9   4. Anemia due to other cause, not classified  D64.89 285.8   5. Unintentional weight loss  R63.4 783.21       Plan       Problem List Items Addressed This Visit          Cardiac/Vascular    Hypertension    Overview     Current medication hydrochlorothiazide 25 mg (using prn)         Current Assessment & Plan     BP Readings from Last 3 Encounters:   07/10/24 139/79   04/29/24 127/81   04/03/24 114/72     Follow low sodium diet, < 2 gm/day (avoid high salty foods such as processed meats/ sausage/farooq/ sandwich meat, chips, pickles, cheese, crackers and soft drinks/ electrolyte replacement drinks).  Avoid tobacco/ alcohol use  Educated on health benefits of at least 5 days/ week of 30 minutes moderate intensity exercise (brisk walking) and 2 or more days/ week of muscle strength activities  Daily ASA 81 mg for CV prevention  Continue current medication regimen           Relevant Medications    hydroCHLOROthiazide (HYDRODIURIL) 25 MG tablet       Renal/    Feeling of incomplete bladder emptying    Current Assessment & Plan     See sensation of pressure in bladder         Relevant Orders    Urinalysis    Sensation of pressure in bladder area - Primary    Current Assessment & Plan     She presents with sensation of pressure in urinary bladder and feeling of incomplete emptying.  Onset x4 days.  Denies dysuria.  Urine ordered and we will notify patient of results.         Relevant Orders    Urinalysis       Oncology    Other specified anemias    Current Assessment & Plan     Patient does endorse feelings of coldness.  Does have regular menstrual cycles.  Recheck CBC with smear today         Relevant Orders    CBC Auto Differential    Path Review, Peripheral Smear       Endocrine    Unintentional weight loss    Current Assessment & Plan     Weight lost 5 lb since last visit.  She denies trying to lose weight.  Lab work overall unremarkable except anemia.  We will order  repeat CBC with smear today for further eval.  Patient otherwise is asymptomatic and denies alarming symptoms.            Future Appointments   Date Time Provider Department Center   8/30/2024  9:00 AM [facility] MRI1 [address] [facility]   9/3/2024  9:45 AM Dory Ann PA-C [facility] CARD [facility]   9/11/2024  8:40 AM Мария Stapleton NP [facility] INTMED [facility]   10/30/2024  8:40 AM Ayana Francisco PA-C [facility] BSRG [facility]   12/3/2024  8:30 AM [facility] [id number] SCR1 [facility] LIZA [facility]   4/16/2025 12:45 PM Stone Upton MD [facility]        Follow up in about 2 months (around 9/10/2024) for wt/ anemia f/u .    Signature:  Мария Stapleton NP  [facility]  [address] [facility] [address]    Date of encounter: 7/10/24

## 2024-07-10 NOTE — ASSESSMENT & PLAN NOTE
Weight lost 5 lb since last visit.  She denies trying to lose weight.  Lab work overall unremarkable except anemia.  We will order repeat CBC with smear today for further eval.  Patient otherwise is asymptomatic and denies alarming symptoms.

## 2024-07-10 NOTE — ASSESSMENT & PLAN NOTE
She presents with sensation of pressure in urinary bladder and feeling of incomplete emptying.  Onset x4 days.  Denies dysuria.  Urine ordered and we will notify patient of results.

## 2024-10-03 ENCOUNTER — OFFICE VISIT (OUTPATIENT)
Dept: CARDIOLOGY | Facility: CLINIC | Age: 34
End: 2024-10-03
Payer: MEDICAID

## 2024-10-03 VITALS
HEIGHT: 67 IN | OXYGEN SATURATION: 100 % | RESPIRATION RATE: 18 BRPM | DIASTOLIC BLOOD PRESSURE: 57 MMHG | HEART RATE: 60 BPM | WEIGHT: 133.19 LBS | BODY MASS INDEX: 20.9 KG/M2 | SYSTOLIC BLOOD PRESSURE: 120 MMHG | TEMPERATURE: 99 F

## 2024-10-03 DIAGNOSIS — Z86.79 HISTORY OF ATRIAL FIBRILLATION: ICD-10-CM

## 2024-10-03 DIAGNOSIS — I10 PRIMARY HYPERTENSION: Primary | ICD-10-CM

## 2024-10-03 PROCEDURE — 1160F RVW MEDS BY RX/DR IN RCRD: CPT | Mod: CPTII,,,

## 2024-10-03 PROCEDURE — 3074F SYST BP LT 130 MM HG: CPT | Mod: CPTII,,,

## 2024-10-03 PROCEDURE — 3066F NEPHROPATHY DOC TX: CPT | Mod: CPTII,,,

## 2024-10-03 PROCEDURE — 3060F POS MICROALBUMINURIA REV: CPT | Mod: CPTII,,,

## 2024-10-03 PROCEDURE — 99214 OFFICE O/P EST MOD 30 MIN: CPT | Mod: PBBFAC

## 2024-10-03 PROCEDURE — 3008F BODY MASS INDEX DOCD: CPT | Mod: CPTII,,,

## 2024-10-03 PROCEDURE — 99214 OFFICE O/P EST MOD 30 MIN: CPT | Mod: S$PBB,,,

## 2024-10-03 PROCEDURE — 3078F DIAST BP <80 MM HG: CPT | Mod: CPTII,,,

## 2024-10-03 PROCEDURE — 3044F HG A1C LEVEL LT 7.0%: CPT | Mod: CPTII,,,

## 2024-10-03 PROCEDURE — 1159F MED LIST DOCD IN RCRD: CPT | Mod: CPTII,,,

## 2024-10-03 NOTE — PATIENT INSTRUCTIONS
Follow up in cardiology clinic in 9 months or sooner if needed   Follow up with PCP as directed   Please notify clinic if any new concerns or any change in symptoms

## 2024-10-03 NOTE — PROGRESS NOTES
CHIEF COMPLAINT:   Chief Complaint   Patient presents with    Follow-up     6 mos f/u denies cardiac targets                                                  HPI:  Rebecca Humphrey 34 y.o. female with a past medical history of hypertension and paroxysmal AFib in pregnancy presents to Cardiology Clinic today for follow up and ongoing care.  She was previously seen by Dr. Christianson at [facility].  She has had echocardiogram, stress test, and Holter monitor testing in the past.  All unremarkable.  See full report below.  At last office visit patient stated that she was feeling well overall.  She did endorse some atypical chest pain that occurred only when lying on left side, but she denied any other anginal or anginal equivalents symptoms or any exertional symptoms.    Today the patient states that she feels stable overall.  She continues to have some occasional atypical chest pain that only occurs with lying on her left side and is  relieved with changing positions.  Otherwise she states that she has been feeling.  She reports 1 episode of palpitations that occurred last week while she was out of town, but she states that she believes this was stress induced.  Otherwise she denies any further complaints such as SOB, ANDREA, lightheadedness, dizziness, syncope, lower extremity edema, or claudication symptoms.  She is able to complete her ADLs without any issues or ischemic symptoms.  She reports compliance with all her current medications and is tolerating them well.  She denies any tobacco or other illicit drug use.  She is active in her day-to-day life.  She follows a mostly heart healthy diet.                                                                                                                                                                                                                                                                                         CARDIAC TESTING:  Holter Monitor - 48 Hour 2.20.24  -Sinus  rhythm, average HR 81 bpm, minimum 54 bpm and maximum 132 bpm.  - No profound pauses.  - Rare isolated PACs. No SVT episodes.  - Occasional isolated PVCs. Rare ventricular couplets. No VT episodes.  - No atrial fibrillation/atrial flutter.  - No reported symptoms.    Outpatient Telemetry 10.27.20  1. This is a good quality study.   2. Predominant rhythm is normal sinus rhythm.   3. The minimum heart rate recorded was 49 beats / minute (sinus bradycardia). The maximum heart rate is 156 beats / minute (sinus tachycardia). The mean heart rate is 76 beats / minute.   4. No evidence of AV block is noted.   5. Rare premature atrial contractions noted.   6. No evidence of supraventricular tachycardia is noted.  7. Moderate premature ventricular contractions noted.    8. Rare non sustained bigeminy and salvos are noted.   9. Rare couplets are noted.   10. No evidence of ventricular tachycardia is noted.  11. No evidence of supraventricular arrhythmia is noted.  12. No evidence of ventricular arrhythmia is noted.  13. No pauses were noted.     Echo 10.27.20  1. The study quality is good.   2. The left ventricle is normal in size. Global left ventricular systolic function is borderline normal. The left ventricular ejection fraction is 50%. GLS -14.9%.  3. Mitral valve prolapse is noted. The prolapse is mild. Both the anterior and posterior mitral leaflets are prolapsed.  4.  Mild to moderate (1-2+) mitral regurgitation. Mild to moderate (1-2+) tricuspid regurgitation. Mild (1+) pulmonic regurgitation.  5. The pulmonary artery systolic pressure is 23 mmHg.     Exercise Stress Test 10.27.20  1. Stress EKG is normal.   2. Maximal exercise treadmill test (MPHR : 89 %).  3. The functional capacity is good (10.7 METs).  4. The heart rate recovery is normal.   5. The study quality is average.         Patient Active Problem List   Diagnosis    At high risk for breast cancer    Family history of breast cancer    Family history of  pancreatic cancer    Other fatigue    Hypertension    History of atrial fibrillation    Other specified anemias    Sensation of pressure in bladder area    Feeling of incomplete bladder emptying    Unintentional weight loss     Past Surgical History:   Procedure Laterality Date     SECTION  20     SECTION WITH TUBAL LIGATION      TUBAL LIGATION  20     Social History     Socioeconomic History    Marital status:     Number of children: 4    Highest education level: Associate degree: academic program   Occupational History     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)   Tobacco Use    Smoking status: Never    Smokeless tobacco: Never   Substance and Sexual Activity    Alcohol use: Never    Drug use: Never    Sexual activity: Yes     Partners: Male     Birth control/protection: See Surgical Hx, None     Comment:      Social Drivers of Health     Financial Resource Strain: Low Risk  (1/15/2024)    Overall Financial Resource Strain (CARDIA)     Difficulty of Paying Living Expenses: Not hard at all   Food Insecurity: No Food Insecurity (1/15/2024)    Hunger Vital Sign     Worried About Running Out of Food in the Last Year: Never true     Ran Out of Food in the Last Year: Never true   Transportation Needs: No Transportation Needs (1/15/2024)    PRAPARE - Transportation     Lack of Transportation (Medical): No     Lack of Transportation (Non-Medical): No   Physical Activity: Inactive (2024)    Exercise Vital Sign     Days of Exercise per Week: 0 days     Minutes of Exercise per Session: 0 min   Stress: No Stress Concern Present (1/15/2024)    [employer] - Occupational Stress Questionnaire     Feeling of Stress : Not at all   Housing Stability: Low Risk  (1/15/2024)    Housing Stability Vital Sign     Unable to Pay for Housing in the Last Year: No     Number of Places Lived in the Last Year: 1     Unstable Housing in the Last Year:  No        Family History   Problem Relation Name Age of Onset    Hypertension Mother Yue Humphrey     Heart failure Mother Yue Humphrey     Asthma Father Prince Humphrey    COPD Father Prince Humphrey     Arthritis Father Prince Humphrey     Breast cancer Maternal Grandmother Leida Bautista 61    Cancer Maternal Grandmother Leida Bautista     Melanoma Maternal Aunt Paradise Louann 61    Stroke Maternal Aunt Paradise Louann     Breast cancer Paternal Aunt  46    Pancreatic cancer Paternal Uncle Ulices Humphrey 60    Cancer Paternal Uncle Ulices Humphrey     Diabetes Paternal Grandmother Dina Humphrey     Asthma Son Daniel Humphrey     Asthma Paternal Uncle Keyana Humphrey     Learning disabilities Brother Carmine Humphrey     Cancer Paternal Aunt Sheila Merlin     Breast cancer Paternal Aunt Sheila Merlin     Cancer Maternal Aunt Jerilyn Sandhublanc     Cancer Maternal Cousin Rebekah Morales     Stroke Maternal Aunt Barbara Ayoub     Stroke Maternal Uncle Altaf Bautista     Stroke Maternal Aunt Sheila Bautista     Stroke Maternal Uncle Mark Bautista      Review of patient's allergies indicates:  No Known Allergies      ROS:  Review of Systems   Constitutional: Negative.    HENT: Negative.     Eyes: Negative.    Respiratory: Negative.  Negative for shortness of breath.    Cardiovascular: Negative.  Negative for chest pain (Aytpical, positional), palpitations (Occasional), orthopnea, claudication, leg swelling and PND.   Gastrointestinal: Negative.    Genitourinary: Negative.    Musculoskeletal: Negative.    Skin: Negative.    Neurological: Negative.    Endo/Heme/Allergies: Negative.    Psychiatric/Behavioral: Negative.                                                                                                                                                                                  Negative except as stated in the history of present illness. See HPI for  "details.    PHYSICAL EXAM:  Visit Vitals  BP (!) 120/57   Pulse 60   Temp 98.5 °F (36.9 °C) (Oral)   Resp 18   Ht 5' 7" (1.702 m)   Wt 60.4 kg (133 lb 2.5 oz)   SpO2 100%   BMI 20.86 kg/m²       Physical Exam  HENT:      Head: Normocephalic.      Nose: Nose normal.      Mouth/Throat:      Mouth: Mucous membranes are moist.   Eyes:      Extraocular Movements: Extraocular movements intact.   Cardiovascular:      Rate and Rhythm: Normal rate and regular rhythm.      Pulses: Normal pulses.      Heart sounds: Normal heart sounds.   Pulmonary:      Effort: Pulmonary effort is normal.   Abdominal:      General: There is no distension.   Musculoskeletal:         General: Normal range of motion.      Cervical back: Normal range of motion.      Right lower leg: No edema.      Left lower leg: No edema.   Skin:     General: Skin is warm.   Neurological:      General: No focal deficit present.      Mental Status: She is alert.   Psychiatric:         Mood and Affect: Mood normal.       Current Outpatient Medications   Medication Instructions    hydroCHLOROthiazide (HYDRODIURIL) 25 mg, Oral, Daily        All medications, laboratory studies, cardiac diagnostic imaging reviewed.     Lab Results   Component Value Date    .00 01/18/2024    TRIG 59 01/18/2024    CREATININE 0.79 05/23/2024    MG 1.9 03/22/2020    K 3.4 (L) 05/23/2024        ASSESSMENT/PLAN:    Paroxysmal Afib  - Patient endorses history of one episode of AFIB during pregnancy approximately 4 years ago  - No episodes since then   - Rare palpitations, one episode since last visit - states that she was traveling out of town it was under a great deal of stress  - Describes as heart racing  - Denies any lightheadedness, dizziness, or syncope  - 48 hour Holter monitor completed in February 2024 revealed underlying sinus rhythm, average heart rate 81, no profound pauses, rare PACs, no SVT, occasional isolated PVCs, rare ventricular couplets, no VT, no AFib no a flutter. "  See full report above  - 30 day cardiac event monitor completed in 2020 was unremarkable.  See full report above  - CHADsVASc - 2 Points - 2.2% Stroke Risk per Year (Female, HTN)  - Given isolated event and asymptomatic, no indication for AC at this time. Case discussed with staff cardiologist, Dr. Andrews.     HTN  - BP well controlled today - 120/57  - Continue HCTZ 25 MG   - Counseled on the importance of following a low sodium, heart healthy diet and exercise as tolerated    Atypical Chest Pain/SOB  - Stable and ongoing symptoms from last visit   - Occurs occasionally when laying on left side- states that it is positional in nature and reproducible  - Nonexertional  - Stress test completed in 2020 unremarkable, no indication of infarct or ischemia  - Echocardiogram completed in 2020 unremarkable, EF 50%   - No indication for further testing at this time   - If patient continues to complain of symptoms may consider repeat echocardiogram      Follow up in cardiology clinic in 9 months or sooner if needed   Follow up with PCP as directed   Please notify clinic if any new concerns or any change in symptoms

## 2024-10-30 ENCOUNTER — OFFICE VISIT (OUTPATIENT)
Dept: SURGERY | Facility: CLINIC | Age: 34
End: 2024-10-30
Payer: MEDICAID

## 2024-10-30 VITALS
RESPIRATION RATE: 18 BRPM | DIASTOLIC BLOOD PRESSURE: 78 MMHG | SYSTOLIC BLOOD PRESSURE: 119 MMHG | BODY MASS INDEX: 20.56 KG/M2 | HEIGHT: 67 IN | WEIGHT: 131 LBS | HEART RATE: 72 BPM | OXYGEN SATURATION: 99 % | TEMPERATURE: 98 F

## 2024-10-30 DIAGNOSIS — Z80.3 FAMILY HISTORY OF BREAST CANCER: ICD-10-CM

## 2024-10-30 DIAGNOSIS — Z91.89 AT HIGH RISK FOR BREAST CANCER: Primary | ICD-10-CM

## 2024-10-30 PROCEDURE — 99214 OFFICE O/P EST MOD 30 MIN: CPT | Mod: S$PBB,,,

## 2024-10-30 PROCEDURE — 3074F SYST BP LT 130 MM HG: CPT | Mod: CPTII,,,

## 2024-10-30 PROCEDURE — 3066F NEPHROPATHY DOC TX: CPT | Mod: CPTII,,,

## 2024-10-30 PROCEDURE — 3008F BODY MASS INDEX DOCD: CPT | Mod: CPTII,,,

## 2024-10-30 PROCEDURE — 3078F DIAST BP <80 MM HG: CPT | Mod: CPTII,,,

## 2024-10-30 PROCEDURE — 99999 PR PBB SHADOW E&M-EST. PATIENT-LVL III: CPT | Mod: PBBFAC,,,

## 2024-10-30 PROCEDURE — 1160F RVW MEDS BY RX/DR IN RCRD: CPT | Mod: CPTII,,,

## 2024-10-30 PROCEDURE — 3044F HG A1C LEVEL LT 7.0%: CPT | Mod: CPTII,,,

## 2024-10-30 PROCEDURE — 1159F MED LIST DOCD IN RCRD: CPT | Mod: CPTII,,,

## 2024-10-30 PROCEDURE — 3060F POS MICROALBUMINURIA REV: CPT | Mod: CPTII,,,

## 2024-10-30 PROCEDURE — 99213 OFFICE O/P EST LOW 20 MIN: CPT | Mod: PBBFAC

## 2024-12-12 ENCOUNTER — APPOINTMENT (OUTPATIENT)
Dept: RADIOLOGY | Facility: HOSPITAL | Age: 34
End: 2024-12-12
Payer: MEDICAID

## 2024-12-12 DIAGNOSIS — Z80.3 FAMILY HISTORY OF BREAST CANCER: ICD-10-CM

## 2024-12-12 DIAGNOSIS — Z91.89 AT HIGH RISK FOR BREAST CANCER: ICD-10-CM

## 2024-12-12 PROCEDURE — 25500020 PHARM REV CODE 255

## 2024-12-12 PROCEDURE — 77049 MRI BREAST C-+ W/CAD BI: CPT | Mod: 26,,, | Performed by: RADIOLOGY

## 2024-12-12 PROCEDURE — A9577 INJ MULTIHANCE: HCPCS

## 2024-12-12 PROCEDURE — 77049 MRI BREAST C-+ W/CAD BI: CPT | Mod: TC

## 2024-12-12 RX ADMIN — GADOBENATE DIMEGLUMINE 13 ML: 529 INJECTION, SOLUTION INTRAVENOUS at 11:12

## 2024-12-18 ENCOUNTER — HOSPITAL ENCOUNTER (OUTPATIENT)
Dept: RADIOLOGY | Facility: HOSPITAL | Age: 34
Discharge: HOME | End: 2024-12-18
Payer: MEDICAID

## 2024-12-18 DIAGNOSIS — Z80.3 FAMILY HISTORY OF BREAST CANCER: ICD-10-CM

## 2024-12-18 DIAGNOSIS — Z91.89 AT HIGH RISK FOR BREAST CANCER: ICD-10-CM

## 2024-12-18 DIAGNOSIS — Z12.31 SCREENING MAMMOGRAM FOR BREAST CANCER: ICD-10-CM

## 2024-12-18 PROCEDURE — 77067 SCR MAMMO BI INCL CAD: CPT | Mod: TC

## 2024-12-18 PROCEDURE — 77067 SCR MAMMO BI INCL CAD: CPT | Mod: 26,,, | Performed by: RADIOLOGY

## 2024-12-18 PROCEDURE — 77063 BREAST TOMOSYNTHESIS BI: CPT | Mod: 26,,, | Performed by: RADIOLOGY

## 2025-01-14 ENCOUNTER — OFFICE VISIT (OUTPATIENT)
Dept: INTERNAL MEDICINE | Facility: CLINIC | Age: 35
End: 2025-01-14
Payer: MEDICAID

## 2025-01-14 ENCOUNTER — LAB VISIT (OUTPATIENT)
Dept: LAB | Facility: HOSPITAL | Age: 35
End: 2025-01-14
Attending: NURSE PRACTITIONER
Payer: MEDICAID

## 2025-01-14 VITALS
OXYGEN SATURATION: 100 % | DIASTOLIC BLOOD PRESSURE: 81 MMHG | WEIGHT: 136.69 LBS | HEIGHT: 67 IN | BODY MASS INDEX: 21.45 KG/M2 | HEART RATE: 67 BPM | RESPIRATION RATE: 20 BRPM | SYSTOLIC BLOOD PRESSURE: 134 MMHG | TEMPERATURE: 98 F

## 2025-01-14 DIAGNOSIS — Z00.00 WELLNESS EXAMINATION: ICD-10-CM

## 2025-01-14 DIAGNOSIS — D64.89 ANEMIA DUE TO OTHER CAUSE, NOT CLASSIFIED: ICD-10-CM

## 2025-01-14 DIAGNOSIS — Z23 IMMUNIZATION DUE: ICD-10-CM

## 2025-01-14 DIAGNOSIS — D64.89 ANEMIA DUE TO OTHER CAUSE, NOT CLASSIFIED: Primary | ICD-10-CM

## 2025-01-14 DIAGNOSIS — R63.4 UNINTENTIONAL WEIGHT LOSS: ICD-10-CM

## 2025-01-14 DIAGNOSIS — R10.9 FLANK PAIN: ICD-10-CM

## 2025-01-14 DIAGNOSIS — I10 PRIMARY HYPERTENSION: ICD-10-CM

## 2025-01-14 LAB
ABS NEUT CALC (OHS): 2.93 X10(3)/MCL (ref 2.1–9.2)
ALBUMIN SERPL-MCNC: 3.9 G/DL (ref 3.5–5)
ALBUMIN/GLOB SERPL: 1.1 RATIO (ref 1.1–2)
ALP SERPL-CCNC: 39 UNIT/L (ref 40–150)
ALT SERPL-CCNC: 8 UNIT/L (ref 0–55)
ANION GAP SERPL CALC-SCNC: 5 MEQ/L
AST SERPL-CCNC: 12 UNIT/L (ref 5–34)
BACTERIA #/AREA URNS AUTO: ABNORMAL /HPF
BASOPHILS NFR BLD MANUAL: 0.1 X10(3)/MCL (ref 0–0.2)
BASOPHILS NFR BLD MANUAL: 2 % (ref 0–2)
BILIRUB SERPL-MCNC: 0.7 MG/DL
BILIRUB UR QL STRIP.AUTO: NEGATIVE
BUN SERPL-MCNC: 8.7 MG/DL (ref 7–18.7)
CALCIUM SERPL-MCNC: 9 MG/DL (ref 8.4–10.2)
CHLORIDE SERPL-SCNC: 107 MMOL/L (ref 98–107)
CLARITY UR: CLEAR
CO2 SERPL-SCNC: 26 MMOL/L (ref 22–29)
COLOR UR AUTO: YELLOW
CREAT SERPL-MCNC: 0.71 MG/DL (ref 0.55–1.02)
CREAT/UREA NIT SERPL: 12
EOSINOPHIL NFR BLD MANUAL: 0.1 X10(3)/MCL (ref 0–0.9)
EOSINOPHIL NFR BLD MANUAL: 2 % (ref 0–8)
ERYTHROCYTE [DISTWIDTH] IN BLOOD BY AUTOMATED COUNT: 14.1 % (ref 11.5–17)
GFR SERPLBLD CREATININE-BSD FMLA CKD-EPI: >60 ML/MIN/1.73/M2
GLOBULIN SER-MCNC: 3.5 GM/DL (ref 2.4–3.5)
GLUCOSE SERPL-MCNC: 90 MG/DL (ref 74–100)
GLUCOSE UR QL STRIP: NORMAL
HCT VFR BLD AUTO: 33.4 % (ref 37–47)
HGB BLD-MCNC: 10.6 G/DL (ref 12–16)
HGB UR QL STRIP: ABNORMAL
HYALINE CASTS #/AREA URNS LPF: ABNORMAL /LPF
KETONES UR QL STRIP: NEGATIVE
LEUKOCYTE ESTERASE UR QL STRIP: 25
LYMPHOCYTES NFR BLD MANUAL: 1.56 X10(3)/MCL
LYMPHOCYTES NFR BLD MANUAL: 32 % (ref 13–40)
MCH RBC QN AUTO: 28.3 PG (ref 27–31)
MCHC RBC AUTO-ENTMCNC: 31.7 G/DL (ref 33–36)
MCV RBC AUTO: 89.1 FL (ref 80–94)
MONOCYTES NFR BLD MANUAL: 0.15 X10(3)/MCL (ref 0.1–1.3)
MONOCYTES NFR BLD MANUAL: 3 % (ref 2–11)
MUCOUS THREADS URNS QL MICRO: ABNORMAL /LPF
NEUTROPHILS NFR BLD MANUAL: 60 % (ref 47–80)
NITRITE UR QL STRIP: NEGATIVE
NRBC BLD AUTO-RTO: 0 %
PH UR STRIP: 5.5 [PH]
PLATELET # BLD AUTO: 170 X10(3)/MCL (ref 130–400)
PLATELET # BLD EST: ADEQUATE 10*3/UL
PLATELETS.RETICULATED NFR BLD AUTO: 17.7 % (ref 0.9–11.2)
PMV BLD AUTO: 13.3 FL (ref 7.4–10.4)
POTASSIUM SERPL-SCNC: 3.6 MMOL/L (ref 3.5–5.1)
PROLYMPHOCYTES # BLD MANUAL: 1 %
PROT SERPL-MCNC: 7.4 GM/DL (ref 6.4–8.3)
PROT UR QL STRIP: NEGATIVE
RBC # BLD AUTO: 3.75 X10(6)/MCL (ref 4.2–5.4)
RBC #/AREA URNS AUTO: ABNORMAL /HPF
RBC MORPH BLD: NORMAL
SODIUM SERPL-SCNC: 138 MMOL/L (ref 136–145)
SP GR UR STRIP.AUTO: 1.02 (ref 1–1.03)
SQUAMOUS #/AREA URNS LPF: ABNORMAL /HPF
UROBILINOGEN UR STRIP-ACNC: NORMAL
WBC # BLD AUTO: 4.88 X10(3)/MCL (ref 4.5–11.5)
WBC #/AREA URNS AUTO: ABNORMAL /HPF

## 2025-01-14 PROCEDURE — 1160F RVW MEDS BY RX/DR IN RCRD: CPT | Mod: CPTII,,, | Performed by: NURSE PRACTITIONER

## 2025-01-14 PROCEDURE — 99214 OFFICE O/P EST MOD 30 MIN: CPT | Mod: S$PBB,,, | Performed by: NURSE PRACTITIONER

## 2025-01-14 PROCEDURE — 80053 COMPREHEN METABOLIC PANEL: CPT

## 2025-01-14 PROCEDURE — 3079F DIAST BP 80-89 MM HG: CPT | Mod: CPTII,,, | Performed by: NURSE PRACTITIONER

## 2025-01-14 PROCEDURE — 1159F MED LIST DOCD IN RCRD: CPT | Mod: CPTII,,, | Performed by: NURSE PRACTITIONER

## 2025-01-14 PROCEDURE — 3075F SYST BP GE 130 - 139MM HG: CPT | Mod: CPTII,,, | Performed by: NURSE PRACTITIONER

## 2025-01-14 PROCEDURE — 90471 IMMUNIZATION ADMIN: CPT | Mod: PBBFAC

## 2025-01-14 PROCEDURE — 90656 IIV3 VACC NO PRSV 0.5 ML IM: CPT | Mod: PBBFAC

## 2025-01-14 PROCEDURE — 36415 COLL VENOUS BLD VENIPUNCTURE: CPT

## 2025-01-14 PROCEDURE — 81001 URINALYSIS AUTO W/SCOPE: CPT

## 2025-01-14 PROCEDURE — 3008F BODY MASS INDEX DOCD: CPT | Mod: CPTII,,, | Performed by: NURSE PRACTITIONER

## 2025-01-14 PROCEDURE — 85007 BL SMEAR W/DIFF WBC COUNT: CPT

## 2025-01-14 PROCEDURE — 99213 OFFICE O/P EST LOW 20 MIN: CPT | Mod: PBBFAC,25 | Performed by: NURSE PRACTITIONER

## 2025-01-14 RX ORDER — HYDROCHLOROTHIAZIDE 12.5 MG/1
12.5 TABLET ORAL DAILY
Qty: 30 TABLET | Refills: 5 | Status: SHIPPED | OUTPATIENT
Start: 2025-01-14 | End: 2026-01-14

## 2025-01-14 RX ADMIN — INFLUENZA VIRUS VACCINE 0.5 ML: 15; 15; 15 SUSPENSION INTRAMUSCULAR at 01:01

## 2025-01-14 NOTE — PROGRESS NOTES
Мария L Pieter, NP   [facility]  [address] [place] [address]      PATIENT NAME: Rebecca Humphrey  : 1990  DATE: 25  MRN: [id number]        History of Present Illness / Problem Focused Workflow     Rebecca Humphrey presents to the clinic with Follow-up and Labs Only (3 month f/u with labs )     35 yo female for follow up. Last seen 7/10/24. Pmh includes HTN, history of A fib.   Last wt 127#. Today's wt 136#. Wt gain 9#. Reports had an issue with BP 2 weeks ago with BP around 139/80 at home. Was symptomatic with HA. Took HCTZ but did not see improvement in BP and took old script of lisinopril. She denies having issues with elevated BP often however denies regular compliance with HCTZ. She does mention she tries to drink plenty of water to stay hydrated. Also mentions mid back pain x 1- 2 wks without dysuria, frequency, abd pain, hematuria, n/v, fever, chills. Does have concern of kidneys. Due for CBC for anemia f/u. Otherwise denies any other concerns/ complaints.     Other providers:   [facility]   Dr. Upton, GYN    Review of Systems     Review of Systems   Constitutional: Negative.    HENT: Negative.     Eyes: Negative.    Respiratory: Negative.     Cardiovascular: Negative.    Gastrointestinal: Negative.    Endocrine: Negative.    Genitourinary:  Positive for flank pain.   Skin: Negative.    Allergic/Immunologic: Negative.    Neurological: Negative.    Hematological: Negative.    Psychiatric/Behavioral: Negative.         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)   Tobacco Use    Smoking status: Never    Smokeless tobacco: Never   Substance and Sexual Activity    Alcohol use: Never    Drug use: Never    Sexual activity: Yes     Partners: Male     Birth control/protection: See Surgical Hx, None     Comment:      Social Drivers of Health     Financial Resource Strain: Low Risk  (1/15/2024)    Overall Financial Resource Strain (CARDIA)     Difficulty of Paying Living Expenses: Not hard at all   Food Insecurity: No Food Insecurity (1/15/2024)    Hunger Vital Sign     Worried About Running Out of Food in the Last Year: Never true     Ran Out of Food in the Last Year: Never true   Transportation Needs: No Transportation Needs (1/15/2024)    PRAPARE - Transportation     Lack of Transportation (Medical): No     Lack of Transportation (Non-Medical): No   Physical Activity: Inactive (2/29/2024)    Exercise Vital Sign     Days of Exercise per Week: 0 days     Minutes of Exercise per Session: 0 min   Stress: No Stress Concern Present (1/15/2024)    [employer] [place] [employer] - Occupational Stress Questionnaire     Feeling of Stress : Not at all   Housing Stability: Low Risk  (1/15/2024)    Housing Stability Vital Sign     Unable to Pay for Housing in the Last Year: No     Number of Places Lived in the Last Year: 1     Unstable Housing in the Last Year: No        Family History   Problem Relation Name Age of Onset    Hypertension Mother Yue Humphrey     Heart failure Mother Yue Humphrey     Asthma Father Prince Humphrey    COPD Father Prince Humphrey     Arthritis Father Prince Humphrey     Breast cancer Maternal Grandmother Leida Sandhublanc 61    Cancer Maternal Grandmother Leida Bautista     Melanoma Maternal Aunt Paradise Louann 61    Stroke Maternal Aunt Paradise Louann     Breast cancer Paternal Aunt  46     "Pancreatic cancer Paternal Uncle Ulices Humphrey 60    Cancer Paternal Uncle Ulices Humphrey     Diabetes Paternal Grandmother Dina Humphrey     Asthma Son Daniel Humphrey     Asthma Paternal Uncle Keyana Humphrey     Learning disabilities Brother Carmine Humphrey     Cancer Paternal Aunt Sheila Merlin     Breast cancer Paternal Aunt Sheila Merlin     Cancer Maternal Aunt Jerilyn Sandhublanc     Cancer Maternal Cousin Rebekah Morales     Stroke Maternal Aunt Barbara Ayoub     Stroke Maternal Uncle Altaf Bautista     Stroke Maternal Aunt Sheila Bautista     Stroke Maternal Uncle Mark Bautista         Medications and Allergies     Medications  Current Outpatient Medications   Medication Instructions    hydroCHLOROthiazide (HYDRODIURIL) 12.5 mg, Oral, Daily       Allergies  Review of patient's allergies indicates:  No Known Allergies    Physical Examination     Visit Vitals  /81   Pulse 67   Temp 98.3 °F (36.8 °C) (Oral)   Resp 20   Ht 5' 7" (1.702 m)   Wt 62 kg (136 lb 11.2 oz)   LMP 01/14/2025 (Approximate)   SpO2 100%   BMI 21.41 kg/m²       Physical Exam  Constitutional:       General: She is not in acute distress.     Appearance: Normal appearance. She is not ill-appearing or diaphoretic.   HENT:      Head: Normocephalic.   Cardiovascular:      Rate and Rhythm: Normal rate and regular rhythm.      Heart sounds: No murmur heard.  Pulmonary:      Effort: Pulmonary effort is normal. No respiratory distress.      Breath sounds: Normal breath sounds. No stridor. No wheezing, rhonchi or rales.   Abdominal:      Tenderness: There is no abdominal tenderness. There is no right CVA tenderness or left CVA tenderness.   Musculoskeletal:      Lumbar back: No tenderness.   Skin:     General: Skin is warm and dry.   Neurological:      Mental Status: She is alert and oriented to person, place, and time. Mental status is at baseline.      Coordination: Coordination normal.      Gait: Gait normal.   Psychiatric:         Mood and Affect: " Mood normal.         Behavior: Behavior normal.         Thought Content: Thought content normal.         Judgment: Judgment normal.           Results     Assessment       ICD-10-CM ICD-9-CM   1. Anemia due to other cause, not classified  D64.89 285.8   2. Immunization due  Z23 V05.9   3. Unintentional weight loss  R63.4 783.21   4. Flank pain  R10.9 789.09   5. Primary hypertension  I10 401.9   6. Wellness examination  Z00.00 V70.0       Plan       Problem List Items Addressed This Visit          Cardiac/Vascular    Hypertension    Overview     Current medication hydrochlorothiazide 25 mg (using prn)         Current Assessment & Plan     BP Readings from Last 3 Encounters:   01/14/25 134/81   10/30/24 119/78   10/03/24 (!) 120/57   Did not take medication this morning   Takes PRN  Follow low sodium diet, < 2 gm/day (avoid high salty foods such as processed meats/ sausage/farooq/ sandwich meat, chips, pickles, cheese, crackers and soft drinks/ electrolyte replacement drinks).  Avoid tobacco/ alcohol use  Educated on health benefits of at least 5 days/ week of 30 minutes moderate intensity exercise (brisk walking) and 2 or more days/ week of muscle strength activities  Daily ASA 81 mg for CV prevention  DC HCTZ 25 mg and start lower dose of HCTZ 12.5 mg          Relevant Medications    hydroCHLOROthiazide (HYDRODIURIL) 12.5 MG Tab    Other Relevant Orders    CBC Auto Differential    Comprehensive Metabolic Panel    Hemoglobin A1C    Lipid Panel    TSH    Microalbumin/Creatinine Ratio, Urine       Oncology    Other specified anemias - Primary    Relevant Orders    CBC Auto Differential    Path Review, Peripheral Smear    CBC Auto Differential       Endocrine    Unintentional weight loss    Current Assessment & Plan     Wt gain 9# since last visit.           Other Visit Diagnoses       Immunization due        Relevant Medications    influenza (Flulaval, Fluzone, Fluarix) 45 mcg/0.5 mL IM vaccine (> or = 6 mo) 0.5 mL  (Completed)    Flank pain        Relevant Orders    Comprehensive Metabolic Panel    Urinalysis (Completed)    Wellness examination        Relevant Orders    CBC Auto Differential    Comprehensive Metabolic Panel    Hemoglobin A1C    Lipid Panel    TSH    Microalbumin/Creatinine Ratio, Urine            Future Appointments   Date Time Provider Department Center   4/16/2025 12:45 PM Stone Upton MD [facility]   7/3/2025  9:15 AM Dory Ann PA-C [facility] [place]   7/15/2025  9:20 AM Мар[facility] [place]   10/30/2025 11:00 AM Ayana Francisco PA-C [facility]        Follow up in about 6 months (around 7/14/2025) for HTN and anemia with fasting labs .    Signature:  Мария Stapleton NP  [facility]  [address] [id number]    Date of encounter: 1/14/25

## 2025-01-14 NOTE — ASSESSMENT & PLAN NOTE
BP Readings from Last 3 Encounters:   01/14/25 134/81   10/30/24 119/78   10/03/24 (!) 120/57   Did not take medication this morning   Takes PRN  Follow low sodium diet, < 2 gm/day (avoid high salty foods such as processed meats/ sausage/farooq/ sandwich meat, chips, pickles, cheese, crackers and soft drinks/ electrolyte replacement drinks).  Avoid tobacco/ alcohol use  Educated on health benefits of at least 5 days/ week of 30 minutes moderate intensity exercise (brisk walking) and 2 or more days/ week of muscle strength activities  Daily ASA 81 mg for CV prevention  DC HCTZ 25 mg and start lower dose of HCTZ 12.5 mg

## 2025-01-27 ENCOUNTER — TELEPHONE (OUTPATIENT)
Dept: INTERNAL MEDICINE | Facility: CLINIC | Age: 35
End: 2025-01-27
Payer: MEDICAID

## 2025-01-27 DIAGNOSIS — D64.89 ANEMIA DUE TO OTHER CAUSE, NOT CLASSIFIED: Primary | ICD-10-CM

## 2025-01-27 NOTE — PROGRESS NOTES
Please ask patient if she was menstruating at the time of her most recent urinalysis testing as it showed blood was found?  Anemia levels are stable; I ordered referral to hematology for further evaluation if needed.

## 2025-01-27 NOTE — TELEPHONE ENCOUNTER
----- Message from Мария Stapleton NP sent at 1/27/2025  1:34 PM CST -----  Please ask patient if she was menstruating at the time of her most recent urinalysis testing as it showed blood was found?  Anemia levels are stable; I ordered referral to hematology for further evaluation if needed.

## 2025-01-27 NOTE — TELEPHONE ENCOUNTER
Attempted to contact pt to inform her of PCP 's message below. Pt did not answer. LVM to return call to [facility] [place] [facility].

## 2025-01-28 NOTE — TELEPHONE ENCOUNTER
Stated was on her menstrual cycle at that time.   Informed of stable anemia levels and referral was made to hematology for further evaluation. She voiced understanding.

## 2025-01-30 ENCOUNTER — TELEPHONE (OUTPATIENT)
Dept: HEMATOLOGY/ONCOLOGY | Facility: CLINIC | Age: 35
End: 2025-01-30
Payer: MEDICAID

## 2025-01-30 NOTE — TELEPHONE ENCOUNTER
Please let pt know she will need to repeat CBC and CMP (blood work orders) in about a month. In order to be reviewed for referral to hematology, this needs to be completed. Please advise her to report to lab during this time to complete.     Thank you

## 2025-01-30 NOTE — TELEPHONE ENCOUNTER
Good afternoon, received referral for hematology. All hematology referrals require two recent sets of labs ranging from 2 weeks to one month apart. Labs need to include both CBC and CMP. Are you able to order more labs?      ROBER Banks  Hem/Onc

## 2025-01-31 NOTE — TELEPHONE ENCOUNTER
Contacted informed the need of repeat blood work within one month.( 02/14/2025)to have hematology completed.She voiced understanding.

## 2025-04-02 ENCOUNTER — PATIENT MESSAGE (OUTPATIENT)
Dept: INTERNAL MEDICINE | Facility: CLINIC | Age: 35
End: 2025-04-02
Payer: MEDICAID

## 2025-04-03 ENCOUNTER — E-VISIT (OUTPATIENT)
Dept: INTERNAL MEDICINE | Facility: CLINIC | Age: 35
End: 2025-04-03
Payer: MEDICAID

## 2025-04-03 DIAGNOSIS — J32.9 SINUSITIS, UNSPECIFIED CHRONICITY, UNSPECIFIED LOCATION: Primary | ICD-10-CM

## 2025-04-03 RX ORDER — AMOXICILLIN AND CLAVULANATE POTASSIUM 875; 125 MG/1; MG/1
1 TABLET, FILM COATED ORAL EVERY 12 HOURS
Qty: 14 TABLET | Refills: 0 | Status: SHIPPED | OUTPATIENT
Start: 2025-04-03 | End: 2025-04-10

## 2025-04-03 RX ORDER — LORATADINE 10 MG/1
10 TABLET ORAL DAILY
Qty: 30 TABLET | Refills: 0 | Status: SHIPPED | OUTPATIENT
Start: 2025-04-03 | End: 2026-04-03

## 2025-04-03 RX ORDER — FLUTICASONE PROPIONATE 50 MCG
1 SPRAY, SUSPENSION (ML) NASAL DAILY
Qty: 9.9 ML | Refills: 0 | Status: SHIPPED | OUTPATIENT
Start: 2025-04-03

## 2025-04-03 NOTE — PROGRESS NOTES
Patient ID: Rebecca Humphrey is a 34 y.o. female.    Chief Complaint: General Illness (Entered automatically based on patient selection in [email or web address].)    The patient initiated a request through [email or web address] on 4/3/2025 for evaluation and management with a chief complaint of General Illness (Entered automatically based on patient selection in [email or web address].)     I evaluated the questionnaire submission on 4/3/2025.    [facility]-Cough And Cold    4/3/2025  8:29 AM CDT - Filed by Patient   What do you need help with? Sinus Infection   Do you agree to participate in an E-Visit? Yes   If you have any of the following symptoms, go to your local emergency room or call 911: I acknowledge   Do you have any of the following pregnancy-related conditions? None   What is the main issue you would like addressed today? Post nasal drip, sinus pressure, headache   Do you think you might have COVID-19 or the Flu? No   What symptoms do you currently have?  Headache;  Runny nose;  Face and nose pain;  Ear pain   Have you ever smoked? Never smoked   Do you have a fever? No   When did your concern begin? 3/28/2025   In the last two weeks, have you been in close contact with someone who has COVID-19, the Flu, or strep throat? No   What have you tried to help your symptoms? Cold medication;  Drinking more fluids;  Rest and sleep   On a scale of 1-10, where 10 is the worst you can imagine, how severe are your symptoms? (range: 1 - 10) 8   How have your symptoms changed since they first started? No change   Do you have transportation to an [facility] location to get tested for COVID-19? Yes   Provide any additional information you feel is important.    Please attach any relevant images or files    Are you able to take your vital signs? Yes   Systolic Blood Pressure: 126   Diastolic Blood Pressure: 80   Weight: 134   Height: 67   Pulse: 64   Temperature: 97.7   Respiration rate:    Pulse Oxygen:          Encounter Diagnosis   Name  Primary?    Sinusitis, unspecified chronicity, unspecified location Yes        No orders of the defined types were placed in this encounter.     Medications Ordered This Encounter   Medications    amoxicillin-clavulanate 875-125mg (AUGMENTIN) 875-125 mg per tablet     Sig: Take 1 tablet by mouth every 12 (twelve) hours. for 7 days     Dispense:  14 tablet     Refill:  0    fluticasone propionate (FLONASE) 50 mcg/actuation nasal spray     Si spray (50 mcg total) by Each Nostril route once daily.     Dispense:  9.9 mL     Refill:  0    loratadine (CLARITIN) 10 mg tablet     Sig: Take 1 tablet (10 mg total) by mouth once daily.     Dispense:  30 tablet     Refill:  0   Sinus issues are commonly from viral origins, which is not treated with antibiotics. I recommend trying Loratadine and Fluticasone nasal spray for the next few days. If symptoms last greater than 7-10 days from start of symptoms or if your symptoms worsen, you may start antibiotic as prescribed. Please complete all of antibiotic if you begin taking for twice daily for 7 days. If your symptoms should worsen or no improvement with above treatments, please contact provider for in clinic visit and/or may report to urgent care for further evaluation of your symptoms.     No follow-ups on file.      E-Visit Time Tracking:    Day 1 Time (in minutes): 5    Total Time (in minutes): 5